# Patient Record
Sex: MALE | Race: WHITE | NOT HISPANIC OR LATINO | Employment: FULL TIME | URBAN - METROPOLITAN AREA
[De-identification: names, ages, dates, MRNs, and addresses within clinical notes are randomized per-mention and may not be internally consistent; named-entity substitution may affect disease eponyms.]

---

## 2021-04-23 ENCOUNTER — TELEPHONE (OUTPATIENT)
Dept: SCHEDULING | Facility: IMAGING CENTER | Age: 31
End: 2021-04-23

## 2021-05-06 ENCOUNTER — TELEPHONE (OUTPATIENT)
Dept: MEDICAL GROUP | Facility: LAB | Age: 31
End: 2021-05-06

## 2021-05-06 NOTE — TELEPHONE ENCOUNTER
NEW PATIENT VISIT PRE-VISIT PLANNING    1.  [employer] Patient is checked in Patient Demographics?Yes    2.  Immunizations were updated in Epic using Reconcile Outside Information activity? Yes         3.  Is this appointment scheduled as a Hospital Follow-Up? No    4.  Patient is due for the following Health Maintenance Topics:   Health Maintenance Due   Topic Date Due   • IMM PNEUMOCOCCAL VACCINE: 0-64 Years (1 of 1 - PPSV23) Never done   • IMM DTaP/Tdap/Td Vaccine (7 - Td) 01/18/2018     5.  Reviewed/Updated the following with patient:       •   Preferred Pharmacy? Yes       •   Preferred Lab? Yes       •   Preferred Communication? Yes       •   Allergies? Yes       •   Medications? YES. Was Abstract Encounter opened and chart updated? YES     6.  Updated Care Team?       •   DME Company (gait device, O2, CPAP, etc.) N\A       •   Other Specialists (eye doctor, derm, GYN, cardiology, endo, etc): N\A    7.  AHA (Puls8) form printed for Provider? N/A

## 2021-05-13 ENCOUNTER — OFFICE VISIT (OUTPATIENT)
Dept: MEDICAL GROUP | Facility: LAB | Age: 31
End: 2021-05-13
Payer: COMMERCIAL

## 2021-05-13 VITALS
WEIGHT: 315 LBS | SYSTOLIC BLOOD PRESSURE: 140 MMHG | RESPIRATION RATE: 18 BRPM | OXYGEN SATURATION: 100 % | HEART RATE: 73 BPM | HEIGHT: 76 IN | TEMPERATURE: 98.1 F | BODY MASS INDEX: 38.36 KG/M2 | DIASTOLIC BLOOD PRESSURE: 100 MMHG

## 2021-05-13 DIAGNOSIS — Z91.89 AT RISK FOR SLEEP APNEA: ICD-10-CM

## 2021-05-13 DIAGNOSIS — R03.0 ELEVATED BLOOD PRESSURE READING IN OFFICE WITHOUT DIAGNOSIS OF HYPERTENSION: ICD-10-CM

## 2021-05-13 DIAGNOSIS — G44.229 CHRONIC TENSION-TYPE HEADACHE, NOT INTRACTABLE: ICD-10-CM

## 2021-05-13 DIAGNOSIS — F32.9 MAJOR DEPRESSIVE DISORDER WITH CURRENT ACTIVE EPISODE, UNSPECIFIED DEPRESSION EPISODE SEVERITY, UNSPECIFIED WHETHER RECURRENT: Primary | ICD-10-CM

## 2021-05-13 DIAGNOSIS — D22.9 BENIGN MOLE: ICD-10-CM

## 2021-05-13 PROCEDURE — 99204 OFFICE O/P NEW MOD 45 MIN: CPT | Performed by: PHYSICIAN ASSISTANT

## 2021-05-13 ASSESSMENT — PATIENT HEALTH QUESTIONNAIRE - PHQ9
SUM OF ALL RESPONSES TO PHQ QUESTIONS 1-9: 6
5. POOR APPETITE OR OVEREATING: 0 - NOT AT ALL
CLINICAL INTERPRETATION OF PHQ2 SCORE: 1

## 2021-05-13 NOTE — ASSESSMENT & PLAN NOTE
New to me; chronic  Tend to start in the back of the head, though can start anywhere in the head.   Does report that he has occasional headaches upon waking.     Having increased stress and this worsens them    Reports that he does snore heavily and has apneic episodes. -Both mother and father have DEBORAH.

## 2021-05-17 NOTE — ASSESSMENT & PLAN NOTE
New to me; chronic and worsening, wife encouraging him to get treatment for this.   Not currently using medication for this.   Denies SI/HI  No previous hospitalizations for mental illness.

## 2021-05-17 NOTE — PROGRESS NOTES
oYu Sunmer is a 30 y.o. male new patient here for chronic headaches, MDD    HPI:    Chronic tension-type headache, not intractable  New to me; chronic  Tend to start in the back of the head, though can start anywhere in the head.   Does report that he has occasional headaches upon waking.     Having increased stress and this worsens them    Reports that he does snore heavily and has apneic episodes. -Both mother and father have DEBORAH.     Major depressive disorder  New to me; chronic and worsening, wife encouraging him to get treatment for this.   Not currently using medication for this.   Denies SI/HI  No previous hospitalizations for mental illness.     Current medicines (including changes today)  Current Outpatient Medications   Medication Sig Dispense Refill   • Aspirin-Acetaminophen-Caffeine (EXCEDRIN PO) Take  by mouth.       No current facility-administered medications for this visit.     He  has a past medical history of Head ache.  He  has a past surgical history that includes appendectomy.  Social History     Tobacco Use   • Smoking status: Former Smoker     Types: Cigarettes, Pipe   • Smokeless tobacco: Never Used   Vaping Use   • Vaping Use: Never used   Substance Use Topics   • Alcohol use: Yes     Comment: weekly   • Drug use: No     Social History     Social History Narrative        Works for [employer]      Family History   Problem Relation Age of Onset   • No Known Problems Mother    • Hyperlipidemia Father    • Hypertension Father    • Heart Disease Father    • No Known Problems Sister    • No Known Problems Brother    • No Known Problems Sister      Family Status   Relation Name Status   • Mo  Alive   • Fa  Alive   • Sis  Alive   • Bro  Alive   • Sis  Alive         ROS  +fatigue and weight gain   No chest pain, cough or shortness of breath.   No heartburn, abdominal pain or stool changes.   No joint pain or falls.   +headaches  No vision changes  +depression    All other systems  "reviewed and are negative     Objective:     /100   Pulse 73   Temp 36.7 °C (98.1 °F) (Temporal)   Resp 18   Ht 1.93 m (6' 4\")   Wt (!) 148 kg (325 lb 12.8 oz)   SpO2 100%  Body mass index is 39.66 kg/m².  Physical Exam:    Constitutional: Alert, no distress.  Skin: Warm, dry, good turgor, no rashes in visible areas.  Eye: Equal, round, conjunctiva clear, lids normal.  Neck: Trachea midline, no masses, no thyromegaly. No cervical or supraclavicular lymphadenopathy.  Respiratory: Unlabored respiratory effort, lungs clear to auscultation, no wheezes, no ronchi.  Cardiovascular: Normal S1, S2, no murmur, no edema.  Psych: Alert and oriented x3, normal affect and mood.      Assessment and Plan:   The following treatment plan was discussed    1. Major depressive disorder with current active episode, unspecified depression episode severity, unspecified whether recurrent  New to me; chronic and not currently treated.   Recommend referral to  for therapy/psychology at this time.   - REFERRAL TO BEHAVIORAL HEALTH  - CBC WITH DIFFERENTIAL; Future  - VITAMIN D,25 HYDROXY; Future    2. Chronic tension-type headache, not intractable  New to me; chronic and uncontrolled, though improve with OTC medication  I do believe that some of this is related to uncontrolled blood pressure as well as possible sleep apnea.   Will continue to monitor.     3. BMI 39.0-39.9,adult  BMI was assessed today and reviewed with patient as meeting criteria for the risk factor obesity.  Willing to change as well as barriers were assessed. A collaborative plan was made with the patient and goals were set. Assistance was discussed, including self-help and more formal medical adjunctive treatments.     Wt Readings from Last 3 Encounters:   05/13/21 (!) 148 kg (325 lb 12.8 oz)   09/23/15 104 kg (230 lb)   07/29/15 109 kg (240 lb)   - CBC WITH DIFFERENTIAL; Future  - Comp Metabolic Panel; Future  - Lipid Profile; Future  - HEMOGLOBIN A1C; " Future  - TSH WITH REFLEX TO FT4; Future  - REFERRAL TO PULMONARY AND SLEEP MEDICINE    4. At risk for sleep apnea  - REFERRAL TO PULMONARY AND SLEEP MEDICINE    5. Benign mole  - REFERRAL TO DERMATOLOGY    6. Elevated blood pressure reading in office without diagnosis of HTN  Continue to monitor BP at home.   DASH diet   RTC in 2 weeks for BP check.     My total time spent caring for the patient on the day of the encounter was 45 minutes.   This does not include time spent on separately billable procedures/tests.    Records requested.  Followup: Return in about 2 weeks (around 5/27/2021).       Mitzy Wiggins P.A.-C.  Supervising MD: Dr. Sven Melvin MD  05/17/21

## 2021-05-22 ENCOUNTER — HOSPITAL ENCOUNTER (OUTPATIENT)
Dept: LAB | Facility: MEDICAL CENTER | Age: 31
End: 2021-05-22
Attending: PHYSICIAN ASSISTANT
Payer: COMMERCIAL

## 2021-05-22 DIAGNOSIS — F32.9 MAJOR DEPRESSIVE DISORDER WITH CURRENT ACTIVE EPISODE, UNSPECIFIED DEPRESSION EPISODE SEVERITY, UNSPECIFIED WHETHER RECURRENT: ICD-10-CM

## 2021-05-22 PROCEDURE — 80053 COMPREHEN METABOLIC PANEL: CPT

## 2021-05-22 PROCEDURE — 82306 VITAMIN D 25 HYDROXY: CPT

## 2021-05-22 PROCEDURE — 84443 ASSAY THYROID STIM HORMONE: CPT

## 2021-05-22 PROCEDURE — 83036 HEMOGLOBIN GLYCOSYLATED A1C: CPT

## 2021-05-22 PROCEDURE — 80061 LIPID PANEL: CPT

## 2021-05-22 PROCEDURE — 36415 COLL VENOUS BLD VENIPUNCTURE: CPT

## 2021-05-22 PROCEDURE — 85025 COMPLETE CBC W/AUTO DIFF WBC: CPT

## 2021-05-24 LAB
ALBUMIN SERPL BCP-MCNC: 4.5 G/DL (ref 3.2–4.9)
ALBUMIN/GLOB SERPL: 1.5 G/DL
ALP SERPL-CCNC: 85 U/L (ref 30–99)
ALT SERPL-CCNC: 38 U/L (ref 2–50)
ANION GAP SERPL CALC-SCNC: 10 MMOL/L (ref 7–16)
AST SERPL-CCNC: 32 U/L (ref 12–45)
BASOPHILS # BLD AUTO: 1.5 % (ref 0–1.8)
BASOPHILS # BLD: 0.09 K/UL (ref 0–0.12)
BILIRUB SERPL-MCNC: 0.4 MG/DL (ref 0.1–1.5)
BUN SERPL-MCNC: 14 MG/DL (ref 8–22)
CALCIUM SERPL-MCNC: 9.4 MG/DL (ref 8.5–10.5)
CHLORIDE SERPL-SCNC: 105 MMOL/L (ref 96–112)
CHOLEST SERPL-MCNC: 156 MG/DL (ref 100–199)
CO2 SERPL-SCNC: 26 MMOL/L (ref 20–33)
CREAT SERPL-MCNC: 0.9 MG/DL (ref 0.5–1.4)
EOSINOPHIL # BLD AUTO: 0.24 K/UL (ref 0–0.51)
EOSINOPHIL NFR BLD: 4 % (ref 0–6.9)
ERYTHROCYTE [DISTWIDTH] IN BLOOD BY AUTOMATED COUNT: 41 FL (ref 35.9–50)
EST. AVERAGE GLUCOSE BLD GHB EST-MCNC: 108 MG/DL
GLOBULIN SER CALC-MCNC: 3.1 G/DL (ref 1.9–3.5)
GLUCOSE SERPL-MCNC: 83 MG/DL (ref 65–99)
HBA1C MFR BLD: 5.4 % (ref 4–5.6)
HCT VFR BLD AUTO: 45.5 % (ref 42–52)
HDLC SERPL-MCNC: 33 MG/DL
HGB BLD-MCNC: 14.4 G/DL (ref 14–18)
IMM GRANULOCYTES # BLD AUTO: 0.02 K/UL (ref 0–0.11)
IMM GRANULOCYTES NFR BLD AUTO: 0.3 % (ref 0–0.9)
LDLC SERPL CALC-MCNC: 91 MG/DL
LYMPHOCYTES # BLD AUTO: 1.8 K/UL (ref 1–4.8)
LYMPHOCYTES NFR BLD: 29.7 % (ref 22–41)
MCH RBC QN AUTO: 26.7 PG (ref 27–33)
MCHC RBC AUTO-ENTMCNC: 31.6 G/DL (ref 33.7–35.3)
MCV RBC AUTO: 84.4 FL (ref 81.4–97.8)
MONOCYTES # BLD AUTO: 0.58 K/UL (ref 0–0.85)
MONOCYTES NFR BLD AUTO: 9.6 % (ref 0–13.4)
NEUTROPHILS # BLD AUTO: 3.34 K/UL (ref 1.82–7.42)
NEUTROPHILS NFR BLD: 54.9 % (ref 44–72)
NRBC # BLD AUTO: 0 K/UL
NRBC BLD-RTO: 0 /100 WBC
PLATELET # BLD AUTO: 277 K/UL (ref 164–446)
PMV BLD AUTO: 9.4 FL (ref 9–12.9)
POTASSIUM SERPL-SCNC: 4.2 MMOL/L (ref 3.6–5.5)
PROT SERPL-MCNC: 7.6 G/DL (ref 6–8.2)
RBC # BLD AUTO: 5.39 M/UL (ref 4.7–6.1)
SODIUM SERPL-SCNC: 141 MMOL/L (ref 135–145)
TRIGL SERPL-MCNC: 161 MG/DL (ref 0–149)
TSH SERPL DL<=0.005 MIU/L-ACNC: 1.53 UIU/ML (ref 0.38–5.33)
WBC # BLD AUTO: 6.1 K/UL (ref 4.8–10.8)

## 2021-05-26 ENCOUNTER — NON-PROVIDER VISIT (OUTPATIENT)
Dept: MEDICAL GROUP | Facility: LAB | Age: 31
End: 2021-05-26
Payer: COMMERCIAL

## 2021-05-26 VITALS — DIASTOLIC BLOOD PRESSURE: 90 MMHG | SYSTOLIC BLOOD PRESSURE: 148 MMHG

## 2021-05-26 LAB — 25(OH)D3 SERPL-MCNC: 32 NG/ML (ref 30–80)

## 2021-05-26 NOTE — PROGRESS NOTES
You Sumner is a 30 y.o. male here for a non-provider visit for BP Check    Vitals:    05/26/21 1642   BP: 148/90   BP Location: Right arm   Patient Position: Sitting   BP Cuff Size: Large adult     If abnormal, was an in office provider notified today? Yes  Routed to PCP? Yes Appt tomorrow at 3:30

## 2021-05-27 ENCOUNTER — TELEMEDICINE (OUTPATIENT)
Dept: MEDICAL GROUP | Facility: LAB | Age: 31
End: 2021-05-27
Payer: COMMERCIAL

## 2021-05-27 VITALS — BODY MASS INDEX: 38.36 KG/M2 | WEIGHT: 315 LBS | HEIGHT: 76 IN

## 2021-05-27 DIAGNOSIS — I10 BENIGN ESSENTIAL HTN: ICD-10-CM

## 2021-05-27 PROCEDURE — 99213 OFFICE O/P EST LOW 20 MIN: CPT | Mod: 95,CR | Performed by: PHYSICIAN ASSISTANT

## 2021-05-27 RX ORDER — LOSARTAN POTASSIUM 50 MG/1
50 TABLET ORAL DAILY
Qty: 90 TABLET | Refills: 1 | Status: SHIPPED
Start: 2021-05-27 | End: 2021-07-02

## 2021-05-27 ASSESSMENT — FIBROSIS 4 INDEX: FIB4 SCORE: 0.54

## 2021-05-27 NOTE — PROGRESS NOTES
"This evaluation was conducted via Zoom using secure and encrypted videoconferencing technology. The patient was in a private location in the state [facility].    The patient's identity was confirmed and verbal consent was obtained for this virtual visit.    Subjective:     CC: BP follow up  You Sumner is a 30 y.o. male presenting to discuss the evaluation and management of BP follow up    Benign essential HTN  Follow up; pt has been checking BP at home and has been elevated  Came to clinic this morning and was 148/90.  Agreeable to starting medication today       ROS  See HPI  Constitutional: Negative for fever, chills and malaise/fatigue.   Respiratory: Negative for cough and shortness of breath.    Cardiovascular: Negative for leg swelling.   Skin: Negative for rash.   Psychiatric/Behavioral: Negative for depression.  The patient is not nervous/anxious.      No Known Allergies    Current medicines (including changes today)  Current Outpatient Medications   Medication Sig Dispense Refill   • losartan (COZAAR) 50 MG Tab Take 1 tablet by mouth every day. 90 tablet 1   • Aspirin-Acetaminophen-Caffeine (EXCEDRIN PO) Take  by mouth.       No current facility-administered medications for this visit.       He  has a past medical history of Head ache.  He  has a past surgical history that includes appendectomy.      Family History   Problem Relation Age of Onset   • No Known Problems Mother    • Hyperlipidemia Father    • Hypertension Father    • Heart Disease Father    • No Known Problems Sister    • No Known Problems Brother    • No Known Problems Sister      Family Status   Relation Name Status   • Mo  Alive   • Fa  Alive   • Sis  Alive   • Bro  Alive   • Sis  Alive       Patient Active Problem List    Diagnosis Date Noted   • Benign essential HTN 05/27/2021   • Major depressive disorder 05/13/2021   • Chronic tension-type headache, not intractable 05/13/2021          Objective:   Ht 1.93 m (6' 4\")   Wt " (!) 147 kg (325 lb)   BMI 39.56 kg/m²     Physical Exam:  Constitutional: Alert, no distress, well-groomed.  Skin: No rashes in visible areas.  Eye: Round. Conjunctiva clear, lids normal. No icterus.   ENMT: Lips pink without lesions, good dentition, moist mucous membranes. Phonation normal.  Neck: No masses, no thyromegaly. Moves freely without pain.  Respiratory: Unlabored respiratory effort, no cough or audible wheeze  Psych: Alert and oriented x3, normal affect and mood.       Assessment and Plan:   The following treatment plan was discussed:     1. Benign essential HTN  Follow up; continue to monitor bp at home.   Rx as written.   Pt was educated on use and SEs of medication.  DASH/low sodium diet.   Increase exercise as tolerated    We recommend decreasing saturated fat which are found in meats that come from a cow or pig, and found in creams, cheeses, butter, sandoval, and fried foods. We recommend more vegetables, fruits, fish, nuts, olive oil and exercising 5 times a week for 30 minute as tolerated     - losartan (COZAAR) 50 MG Tab; Take 1 tablet by mouth every day.  Dispense: 90 tablet; Refill: 1        Follow-up: Return in about 3 weeks (around 6/17/2021).    Mitzy Wiggins P.A.-C.  Supervising MD: Dr. Sven Melvin MD  05/27/21

## 2021-05-27 NOTE — ASSESSMENT & PLAN NOTE
Follow up; pt has been checking BP at home and has been elevated  Came to clinic this morning and was 148/90.  Agreeable to starting medication today

## 2021-07-02 ENCOUNTER — OFFICE VISIT (OUTPATIENT)
Dept: MEDICAL GROUP | Facility: LAB | Age: 31
End: 2021-07-02
Payer: COMMERCIAL

## 2021-07-02 VITALS
SYSTOLIC BLOOD PRESSURE: 148 MMHG | HEIGHT: 76 IN | HEART RATE: 91 BPM | OXYGEN SATURATION: 96 % | WEIGHT: 315 LBS | DIASTOLIC BLOOD PRESSURE: 108 MMHG | TEMPERATURE: 98.2 F | RESPIRATION RATE: 18 BRPM | BODY MASS INDEX: 38.36 KG/M2

## 2021-07-02 DIAGNOSIS — I10 BENIGN ESSENTIAL HTN: ICD-10-CM

## 2021-07-02 PROCEDURE — 99213 OFFICE O/P EST LOW 20 MIN: CPT | Performed by: PHYSICIAN ASSISTANT

## 2021-07-02 RX ORDER — LOSARTAN POTASSIUM AND HYDROCHLOROTHIAZIDE 25; 100 MG/1; MG/1
1 TABLET ORAL DAILY
Qty: 30 TABLET | Refills: 0 | Status: SHIPPED | OUTPATIENT
Start: 2021-07-02 | End: 2021-08-04 | Stop reason: SDUPTHER

## 2021-07-02 RX ORDER — LOSARTAN POTASSIUM AND HYDROCHLOROTHIAZIDE 25; 100 MG/1; MG/1
1 TABLET ORAL DAILY
Qty: 30 TABLET | Refills: 1 | Status: CANCELLED | OUTPATIENT
Start: 2021-07-02

## 2021-07-02 ASSESSMENT — FIBROSIS 4 INDEX: FIB4 SCORE: 0.54

## 2021-07-02 NOTE — PATIENT INSTRUCTIONS
"DASH Eating Plan  DASH stands for \"Dietary Approaches to Stop Hypertension.\" The DASH eating plan is a healthy eating plan that has been shown to reduce high blood pressure (hypertension). It may also reduce your risk for type 2 diabetes, heart disease, and stroke. The DASH eating plan may also help with weight loss.  What are tips for following this plan?    General guidelines  · Avoid eating more than 2,300 mg (milligrams) of salt (sodium) a day. If you have hypertension, you may need to reduce your sodium intake to 1,500 mg a day.  · Limit alcohol intake to no more than 1 drink a day for nonpregnant women and 2 drinks a day for men. One drink equals 12 oz of beer, 5 oz of wine, or 1½ oz of hard liquor.  · Work with your health care provider to maintain a healthy body weight or to lose weight. Ask what an ideal weight is for you.  · Get at least 30 minutes of exercise that causes your heart to beat faster (aerobic exercise) most days of the week. Activities may include walking, swimming, or biking.  · Work with your health care provider or diet and nutrition specialist (dietitian) to adjust your eating plan to your individual calorie needs.  Reading food labels    · Check food labels for the amount of sodium per serving. Choose foods with less than 5 percent of the Daily Value of sodium. Generally, foods with less than 300 mg of sodium per serving fit into this eating plan.  · To find whole grains, look for the word \"whole\" as the first word in the ingredient list.  Shopping  · Buy products labeled as \"low-sodium\" or \"no salt added.\"  · Buy fresh foods. Avoid canned foods and premade or frozen meals.  Cooking  · Avoid adding salt when cooking. Use salt-free seasonings or herbs instead of table salt or sea salt. Check with your health care provider or pharmacist before using salt substitutes.  · Do not grace foods. Cook foods using healthy methods such as baking, boiling, grilling, and broiling instead.  · Cook with " heart-healthy oils, such as olive, canola, soybean, or sunflower oil.  Meal planning  · Eat a balanced diet that includes:  ? 5 or more servings of fruits and vegetables each day. At each meal, try to fill half of your plate with fruits and vegetables.  ? Up to 6-8 servings of whole grains each day.  ? Less than 6 oz of lean meat, poultry, or fish each day. A 3-oz serving of meat is about the same size as a deck of cards. One egg equals 1 oz.  ? 2 servings of low-fat dairy each day.  ? A serving of nuts, seeds, or beans 5 times each week.  ? Heart-healthy fats. Healthy fats called Omega-3 fatty acids are found in foods such as flaxseeds and coldwater fish, like sardines, salmon, and mackerel.  · Limit how much you eat of the following:  ? Canned or prepackaged foods.  ? Food that is high in trans fat, such as fried foods.  ? Food that is high in saturated fat, such as fatty meat.  ? Sweets, desserts, sugary drinks, and other foods with added sugar.  ? Full-fat dairy products.  · Do not salt foods before eating.  · Try to eat at least 2 vegetarian meals each week.  · Eat more home-cooked food and less restaurant, buffet, and fast food.  · When eating at a restaurant, ask that your food be prepared with less salt or no salt, if possible.  What foods are recommended?  The items listed may not be a complete list. Talk with your dietitian about what dietary choices are best for you.  Grains  Whole-grain or whole-wheat bread. Whole-grain or whole-wheat pasta. Brown rice. Oatmeal. Quinoa. Bulgur. Whole-grain and low-sodium cereals. Lisa bread. Low-fat, low-sodium crackers. Whole-wheat flour tortillas.  Vegetables  Fresh or frozen vegetables (raw, steamed, roasted, or grilled). Low-sodium or reduced-sodium tomato and vegetable juice. Low-sodium or reduced-sodium tomato sauce and tomato paste. Low-sodium or reduced-sodium canned vegetables.  Fruits  All fresh, dried, or frozen fruit. Canned fruit in natural juice (without  added sugar).  Meat and other protein foods  Skinless chicken or turkey. Ground chicken or turkey. Pork with fat trimmed off. Fish and seafood. Egg whites. Dried beans, peas, or lentils. Unsalted nuts, nut butters, and seeds. Unsalted canned beans. Lean cuts of beef with fat trimmed off. Low-sodium, lean deli meat.  Dairy  Low-fat (1%) or fat-free (skim) milk. Fat-free, low-fat, or reduced-fat cheeses. Nonfat, low-sodium ricotta or cottage cheese. Low-fat or nonfat yogurt. Low-fat, low-sodium cheese.  Fats and oils  Soft margarine without trans fats. Vegetable oil. Low-fat, reduced-fat, or light mayonnaise and salad dressings (reduced-sodium). Canola, safflower, olive, soybean, and sunflower oils. Avocado.  Seasoning and other foods  Herbs. Spices. Seasoning mixes without salt. Unsalted popcorn and pretzels. Fat-free sweets.  What foods are not recommended?  The items listed may not be a complete list. Talk with your dietitian about what dietary choices are best for you.  Grains  Baked goods made with fat, such as croissants, muffins, or some breads. Dry pasta or rice meal packs.  Vegetables  Creamed or fried vegetables. Vegetables in a cheese sauce. Regular canned vegetables (not low-sodium or reduced-sodium). Regular canned tomato sauce and paste (not low-sodium or reduced-sodium). Regular tomato and vegetable juice (not low-sodium or reduced-sodium). Pickles. Olives.  Fruits  Canned fruit in a light or heavy syrup. Fried fruit. Fruit in cream or butter sauce.  Meat and other protein foods  Fatty cuts of meat. Ribs. Fried meat. Saldivar. Sausage. Bologna and other processed lunch meats. Salami. Fatback. Hotdogs. Bratwurst. Salted nuts and seeds. Canned beans with added salt. Canned or smoked fish. Whole eggs or egg yolks. Chicken or turkey with skin.  Dairy  Whole or 2% milk, cream, and half-and-half. Whole or full-fat cream cheese. Whole-fat or sweetened yogurt. Full-fat cheese. Nondairy creamers. Whipped toppings.  Processed cheese and cheese spreads.  Fats and oils  Butter. Stick margarine. Lard. Shortening. Ghee. Saldivar fat. Tropical oils, such as coconut, palm kernel, or palm oil.  Seasoning and other foods  Salted popcorn and pretzels. Onion salt, garlic salt, seasoned salt, table salt, and sea salt. Worcestershire sauce. Tartar sauce. Barbecue sauce. Teriyaki sauce. Soy sauce, including reduced-sodium. Steak sauce. Canned and packaged gravies. Fish sauce. Oyster sauce. Cocktail sauce. Horseradish that you find on the shelf. Ketchup. Mustard. Meat flavorings and tenderizers. Bouillon cubes. Hot sauce and Tabasco sauce. Premade or packaged marinades. Premade or packaged taco seasonings. Relishes. Regular salad dressings.  Where to find more information:  · National Heart, Lung, and Blood [place]: www.nhlbi.nih.gov  · American Heart Association: www.heart.org  Summary  · The DASH eating plan is a healthy eating plan that has been shown to reduce high blood pressure (hypertension). It may also reduce your risk for type 2 diabetes, heart disease, and stroke.  · With the DASH eating plan, you should limit salt (sodium) intake to 2,300 mg a day. If you have hypertension, you may need to reduce your sodium intake to 1,500 mg a day.  · When on the DASH eating plan, aim to eat more fresh fruits and vegetables, whole grains, lean proteins, low-fat dairy, and heart-healthy fats.  · Work with your health care provider or diet and nutrition specialist (dietitian) to adjust your eating plan to your individual calorie needs.  This information is not intended to replace advice given to you by your health care provider. Make sure you discuss any questions you have with your health care provider.  Document Released: 12/06/2012 Document Revised: 11/30/2018 Document Reviewed: 12/11/2017  Elsevier Patient Education © 2020 [employer] Inc[employer]

## 2021-07-02 NOTE — ASSESSMENT & PLAN NOTE
Follow up; Pt is currently using losartan 50 mg p.o. once daily.  Blood pressure was significantly elevated today upon initial intake.  However he has had a stressful day at work and we called him back before he could rest in the waiting room.  Second check of blood pressure today was 148/108.  Patient denies any chest pain, shortness of breath and  no palpitations.   Reports that he has been eating a lot of fast food lately.

## 2021-07-02 NOTE — PROGRESS NOTES
"Subjective:   You Sumner is a 30 y.o. male here today for BP follow up     Benign essential HTN  Follow up; Pt is currently using losartan 50 mg p.o. once daily.  Blood pressure was significantly elevated today upon initial intake.  However he has had a stressful day at work and we called him back before he could rest in the waiting room.  Second check of blood pressure today was 148/108.  Patient denies any chest pain, shortness of breath and  no palpitations.   Reports that he has been eating a lot of fast food lately.       Current medicines (including changes today)  Current Outpatient Medications   Medication Sig Dispense Refill   • losartan-hydrochlorothiazide (HYZAAR) 100-25 MG per tablet Take 1 tablet by mouth every day. 30 tablet 0   • Aspirin-Acetaminophen-Caffeine (EXCEDRIN PO) Take  by mouth.       No current facility-administered medications for this visit.     He  has a past medical history of Head ache.    ROS No chest pain, no shortness of breath, no abdominal pain       Objective:     /108 (BP Location: Left arm, Patient Position: Sitting, BP Cuff Size: Large adult)   Pulse 91   Temp 36.8 °C (98.2 °F) (Temporal)   Resp 18   Ht 1.93 m (6' 4\")   Wt (!) 149 kg (328 lb 3.2 oz)   SpO2 96%  Body mass index is 39.95 kg/m².   Physical Exam:  Constitutional: Alert, no distress.  Skin: Warm, dry, good turgor, no rashes in visible areas.  Eye: Equal, round, conjunctiva clear, lids normal.  Neck: Trachea midline, no masses, no thyromegaly.   Respiratory: Unlabored respiratory effort, lungs clear to auscultation, no wheezes, no ronchi.  Cardiovascular: Normal S1, S2, no murmur, no edema.  Psych: Alert and oriented x3, normal affect and mood.    Assessment and Plan:   The following treatment plan was discussed    1. Benign essential HTN  Follow-up, blood pressure is not well controlled today.  Discontinue losartan 50 mg today.  Start Hyzaar 100-25 mg p.o. once daily.  Patient was " educated on use and side effects of medication.  ED precautions if blood pressure is 200/100.  Or if he develops any headache, chest pain, shortness of breath or palpitations or vision changes.   Patient to follow-up in 1 week for BP check with the nurses.  Follow-up thereafter is TBD pending BP reading in 1 week.   Discussed reducing fast food.  Patient was given handout about DASH diet.  - losartan-hydrochlorothiazide (HYZAAR) 100-25 MG per tablet; Take 1 tablet by mouth every day.  Dispense: 30 tablet; Refill: 0      Followup: Return in about 1 week (around 7/9/2021) for BP check .       Mitzy Wiggins P.A.-C.  Supervising MD: Dr. Sven Melvin MD  07/02/21

## 2021-07-09 ENCOUNTER — NON-PROVIDER VISIT (OUTPATIENT)
Dept: MEDICAL GROUP | Facility: LAB | Age: 31
End: 2021-07-09
Payer: COMMERCIAL

## 2021-07-09 VITALS — SYSTOLIC BLOOD PRESSURE: 110 MMHG | DIASTOLIC BLOOD PRESSURE: 76 MMHG | HEART RATE: 78 BPM | OXYGEN SATURATION: 95 %

## 2021-07-09 PROCEDURE — 99999 PR NO CHARGE: CPT | Performed by: PHYSICIAN ASSISTANT

## 2021-07-09 NOTE — NON-PROVIDER
You Sumner is a 30 y.o. male here for a non-provider visit for Blood Pressure Check.    Vitals:    07/09/21 1601 07/09/21 1612   BP: 120/76 110/76   Pulse: 95 78   SpO2: 95%      - Home Monitoring: Blood Pressure at home ranging: Patient does not take BP at home  - Medications: Patient did take blood pressure medication(s) today before appointment. Last dose of blood pressure medication(s) was on 7/9/21 this AM.   - Medication Compliance: Patient reports they are taking blood pressure medication(s) every day as prescribed by provider.    Patient does not have symptoms today.    Outcome: Education Provided: continue with low salt diet and BP meds daily    Routed to PCP? Yes    -portion control  -1500mg sodium daily goal; avoiding high salt foods and looking at the food labels now

## 2021-07-28 ENCOUNTER — OFFICE VISIT (OUTPATIENT)
Dept: BEHAVIORAL HEALTH | Facility: CLINIC | Age: 31
End: 2021-07-28
Payer: COMMERCIAL

## 2021-07-28 DIAGNOSIS — F32.1 CURRENT MODERATE EPISODE OF MAJOR DEPRESSIVE DISORDER, UNSPECIFIED WHETHER RECURRENT (HCC): ICD-10-CM

## 2021-07-28 PROCEDURE — 90791 PSYCH DIAGNOSTIC EVALUATION: CPT | Performed by: SOCIAL WORKER

## 2021-07-28 NOTE — PROGRESS NOTES
"[facility]   Initial Assessment    Name: You Sumner  MRN: [id number]  : 1990  Age: 30 y.o.  Date of assessment: 2021  PCP: Mitzy Wiggins P.A.-C.  Persons in attendance: Patient  Total session time: 45 minutes      CHIEF COMPLAINT AND HISTORY OF PRESENTING PROBLEM:  (as stated by Patient):  You Sumner is a 30 y.o., White male referred for assessment by Mitzy Wiggins,*.  Primary presenting issue includesHe had spoken to his primary care physician about having someone to talk to and he was referred here. He reports that his wife of one year wanted him to get help with his communication. They have been together 3 years prior to their marriage. He was in a prior relationship before his wife and that relationship did not end well. He reports some trust issues that developed from this relationship and perhaps with his dad as well. He had moved to [place] with his previous girlfriend after when she moved here with him became homesick and wanting to return to be close to her family. She and her family had previously lived in [place] and they went to [place] together[place] and the engagement was ended.    After returning from there he met his wife and they dated a couple of years before marrying in . He reports that things have worsen over the past 6 months between and that they suffered a miscarriage in January.  He identifies as an introvert, and school was not always easy for him. He reported learning in the third grade and maybe sooner that to act out his emotion was getting him sent home from school.  \"  So I learned to beat down my emotions and not think about it\".    He admits when his wife begins to talk at him sometimes he just shuts down. \" I am not sure she understands me sometimes\".    BEHAVIORAL HEALTH TREATMENT HISTORY  Does patient/parent report a history of prior " behavioral health treatment for patient? not voluntarily, family counseling with Aunt in [place], couple of sessions  History of untreated behavioral health issues identified? issues from childhood  Does patient/parent report change in appetite or weight loss/gain? gaining weight,emotional/stress eating do not like empty stomach since like age 11.  Does patient/parent report physical pain? plantar facitius              Indicate if pain is acute or chronic, and location: foot              Pain scale rating:  Varies 3-8         FAMILY/SOCIAL HISTORY  Current living situation/household members: Reside with wife. Have a roommate. There was a miscarriage in January.  Does patient/parent report a family history of behavioral health issues, diagnoses, or treatment?   Family History   Problem Relation Age of Onset   • No Known Problems Mother    • Hyperlipidemia Father    • Hypertension Father    • Heart Disease Father    • No Known Problems Sister    • No Known Problems Brother    • No Known Problems Sister           EMPLOYMENT/RESOURCES  Is the patient currently employed? Yes  Does the patient/parent report adequate financial resources? paycheck to [employer]. Wife at [employer] and he is at [employer]       HISTORY:  Does patient report current or past enlistment? No               [If yes, complete below items]  Does patient report history of exposure to combat? No      SPIRITUAL/CULTURAL/IDENTITY:  What are the patient's/family's spiritual beliefs or practices? Was raised nondemontial, do not attend now- ritualistic Orthodox      ABUSE/NEGLECT/TRAUMA SCREENING  Does patient report feeling “unsafe” in his/her home, or afraid of anyone? No  Does patient report any history of physical, sexual, or emotional abuse? 'probably' physical many instances, emotionally sexually not sure, do not think so  Is there evidence of neglect by self? No  Is there evidence of neglect by a caregiver? No                                   "                                                                        SAFETY ASSESSMENT - SELF  Does patient acknowledge current or past symptoms of dangerousness to self? in the past, no attempts earliest time when [age]. Teenager in high school, never feel doing anything right\"  Recent change in frequency/specificity/intensity of suicidal thoughts or self-harm behavior? not as bad but still over so often. Job in october was a poor situation until left to work for [employer]  Current access to firearms, medications, or other identified means of suicide/self-harm? would not kill self and do not think my meds would work for that  If yes, willing to restrict access to means of suicide/self-harm? not indicated at this time      Current Suicide Risk: Not applicable  Crisis Safety Plan completed and copy given to patient: not indicated      SAFETY ASSESSMENT - OTHERS  Recent change in frequency/specificity/intensity of thoughts or threats to harm others? No  If Yes:  Current access to firearms/other identified means of harm?   If yes, willing to restrict access to weapons/means of harm?     Current Homicide Risk:  Not applicable  Crisis Safety Plan completed and copy given to patient? not indicated  Based on information provided during the current assessment, is a mandated “duty to warn” being exercised? No      SUBSTANCE USE/ADDICTION HISTORY  Patient denies use of any substance/addictive behaviors drink occassionally not as much asa before. use to smoke like a 'fiend'. beer maybe 2-3 beers every three days or so or weekends more often not driving    If No:  Is there a family history of substance use/addiction? not that I know of, \" my dad is old mother f\" so probably did stuff in college  Does patient acknowledge or parent/significant other report use of/dependence on substances? No  Last time patient used alcohol: 2- 3 beers every three days  Within the past week? Yes  Last time patient used marijuana: no  Within the past " "month? No  Any other street drugs ever tried even once? No  Any use of prescription medications/pills without a prescription, or for reasons others than originally prescribed?  No  Any other addictive behavior reported (gambling, shopping, sex)? try not to excessively shop, mostly books             MENTAL STATUS/OBSERVATIONS              Participation: Active verbal participation  Grooming: Neat  Orientation:Alert   Behavior: Calm  Eye contact: Good          Mood:Euthymic  Affect:Congruent with content  Thought process: Goal-directed  Thought content:  Within normal limits  Speech: Rate within normal limits and Volume within normal limits  Perception: Within normal limits  Memory: No gross evidence of memory deficits  Insight: Adequate  Judgment:  Adequate  Other:               Family/couple interaction observations: not applicable      Patient's motivation/readiness for change:  \" Wants to have better communication with wife and explore concerns surrounding trust\".    Topics addressed in psychotherapy include:  Initial session and building rapport    Care plan completed: initial session will complete next session  Does patient express agreement with the above plan? Yes     Diagnosis:  1. Current moderate episode of major depressive disorder, unspecified whether recurrent (HCC)        Referral appointment(s) scheduled? Yes       Donna Bruce L.C.S.W.    "

## 2021-08-04 DIAGNOSIS — I10 BENIGN ESSENTIAL HTN: ICD-10-CM

## 2021-08-04 RX ORDER — LOSARTAN POTASSIUM AND HYDROCHLOROTHIAZIDE 25; 100 MG/1; MG/1
1 TABLET ORAL DAILY
Qty: 30 TABLET | Refills: 0 | Status: SHIPPED | OUTPATIENT
Start: 2021-08-04 | End: 2021-09-13

## 2021-08-04 NOTE — TELEPHONE ENCOUNTER
----- Message from You Sumner sent at 8/4/2021  1:55 PM PDT -----  Regarding: Prescription Question  Contact: [phone]  Hello,   I'm nearing the end of my supply of 100mg Losartan/ diarrhetic. I noticed that the bottle didn't have any refills. Did you still want me to take that med?

## 2021-08-17 ENCOUNTER — OFFICE VISIT (OUTPATIENT)
Dept: BEHAVIORAL HEALTH | Facility: CLINIC | Age: 31
End: 2021-08-17
Payer: COMMERCIAL

## 2021-08-17 DIAGNOSIS — F32.1 CURRENT MODERATE EPISODE OF MAJOR DEPRESSIVE DISORDER, UNSPECIFIED WHETHER RECURRENT (HCC): ICD-10-CM

## 2021-08-17 PROCEDURE — 90834 PSYTX W PT 45 MINUTES: CPT | Performed by: SOCIAL WORKER

## 2021-08-17 NOTE — PROGRESS NOTES
[facility]   Therapy Progress Note        Name: You Sumner  MRN: [id number]  : 1990  Age: 30 y.o.  Date of assessment: 2021  PCP: Mitzy Wiggins P.A.-C.  Persons in attendance: Patient  Total session time: 45 minutes      Topics addressed in psychotherapy include:  Leroy is still very stressed about his relationship. They seem to be seperating and he admits he is often pulling away as he as come to 'equate her with stress'.  He reported that they had a good week as he was supportive about her work situation but then last night it turned upside down as the focus was her health and he had to help her go over to care for some pets that he did not want to do.    She called during session which he was frustrated with as she seemed irritated with him for being in session. Discussed in an attempt to reconnect as a couple that they eat at the table at least a couple of nights and discussion is on them only. He thought he would really like to try this, although agreed tonight probably not a good time to introduce.    Objective Observations:   Participation:Active verbal participation   Grooming:Neat   Cognition:Alert   Eye Contact:Good   Mood:Depressed and Irritable   Affect:Sad and frustrated   Thought Process:Logical   Speech:Rate within normal limits and Volume within normal limits    Current Risk:   Suicide: not indicated   Homicide: not indicated   Self-Harm: not indicated   Relapse: not indicated   Safety Plan Reviewed: not applicable    Care Plan Updated: No    Does patient express agreement with the above plan? Yes     Diagnosis:  No diagnosis found.    Referral appointment(s) scheduled? Yes       Donna Bruce L.C.S.W.

## 2021-08-18 ENCOUNTER — APPOINTMENT (RX ONLY)
Dept: URBAN - METROPOLITAN AREA CLINIC 4 | Facility: CLINIC | Age: 31
Setting detail: DERMATOLOGY
End: 2021-08-18

## 2021-08-18 DIAGNOSIS — D22 MELANOCYTIC NEVI: ICD-10-CM

## 2021-08-18 PROBLEM — D48.5 NEOPLASM OF UNCERTAIN BEHAVIOR OF SKIN: Status: ACTIVE | Noted: 2021-08-18

## 2021-08-18 PROBLEM — D22.4 MELANOCYTIC NEVI OF SCALP AND NECK: Status: ACTIVE | Noted: 2021-08-18

## 2021-08-18 PROCEDURE — ? BIOPSY BY SHAVE METHOD

## 2021-08-18 PROCEDURE — ? COUNSELING

## 2021-08-18 PROCEDURE — 99202 OFFICE O/P NEW SF 15 MIN: CPT | Mod: 25

## 2021-08-18 PROCEDURE — 11102 TANGNTL BX SKIN SINGLE LES: CPT

## 2021-08-18 ASSESSMENT — LOCATION DETAILED DESCRIPTION DERM
LOCATION DETAILED: LEFT INFERIOR OCCIPITAL SCALP
LOCATION DETAILED: RIGHT OCCIPITAL SCALP

## 2021-08-18 ASSESSMENT — LOCATION SIMPLE DESCRIPTION DERM: LOCATION SIMPLE: POSTERIOR SCALP

## 2021-08-18 ASSESSMENT — LOCATION ZONE DERM: LOCATION ZONE: SCALP

## 2021-08-18 NOTE — PROCEDURE: BIOPSY BY SHAVE METHOD
Detail Level: Detailed
Depth Of Biopsy: dermis
Was A Bandage Applied: Yes
Size Of Lesion In Cm: 0.9
X Size Of Lesion In Cm: 0
Biopsy Type: H and E
Biopsy Method: Dermablade
Anesthesia Type: 1% lidocaine with epinephrine
Anesthesia Volume In Cc: 0.5
Hemostasis: Drysol
Wound Care: Petrolatum
Dressing: bandage
Destruction After The Procedure: No
Type Of Destruction Used: Curettage
Curettage Text: The wound bed was treated with curettage after the biopsy was performed.
Cryotherapy Text: The wound bed was treated with cryotherapy after the biopsy was performed.
Electrodesiccation Text: The wound bed was treated with electrodesiccation after the biopsy was performed.
Electrodesiccation And Curettage Text: The wound bed was treated with electrodesiccation and curettage after the biopsy was performed.
Silver Nitrate Text: The wound bed was treated with silver nitrate after the biopsy was performed.
Lab: [id number]
Lab Facility: 
Consent: Written consent was obtained and risks were reviewed including but not limited to scarring, infection, bleeding, scabbing, incomplete removal, nerve damage and allergy to anesthesia.
Post-Care Instructions: I reviewed with the patient in detail post-care instructions. Patient is to keep the biopsy site dry overnight, and then apply bacitracin twice daily until healed. Patient may apply hydrogen peroxide soaks to remove any crusting.
Notification Instructions: Patient will be notified of biopsy results. However, patient instructed to call the office if not contacted within 2 weeks.
Billing Type: Third-Party Bill
Information: Selecting Yes will display possible errors in your note based on the variables you have selected. This validation is only offered as a suggestion for you. PLEASE NOTE THAT THE VALIDATION TEXT WILL BE REMOVED WHEN YOU FINALIZE YOUR NOTE. IF YOU WANT TO FAX A PRELIMINARY NOTE YOU WILL NEED TO TOGGLE THIS TO 'NO' IF YOU DO NOT WANT IT IN YOUR FAXED NOTE.

## 2021-09-02 ENCOUNTER — APPOINTMENT (OUTPATIENT)
Dept: BEHAVIORAL HEALTH | Facility: CLINIC | Age: 31
End: 2021-09-02
Payer: COMMERCIAL

## 2021-09-13 DIAGNOSIS — I10 BENIGN ESSENTIAL HTN: ICD-10-CM

## 2021-09-13 RX ORDER — LOSARTAN POTASSIUM AND HYDROCHLOROTHIAZIDE 25; 100 MG/1; MG/1
TABLET ORAL
Qty: 30 TABLET | Refills: 0 | Status: SHIPPED | OUTPATIENT
Start: 2021-09-13 | End: 2021-10-22 | Stop reason: SDUPTHER

## 2021-09-13 NOTE — TELEPHONE ENCOUNTER
Received request via: Pharmacy    Was the patient seen in the last year in this department? Yes  7/2/21  Does the patient have an active prescription (recently filled or refills available) for medication(s) requested? No

## 2021-10-07 ENCOUNTER — OFFICE VISIT (OUTPATIENT)
Dept: BEHAVIORAL HEALTH | Facility: CLINIC | Age: 31
End: 2021-10-07
Payer: COMMERCIAL

## 2021-10-07 DIAGNOSIS — F32.1 CURRENT MODERATE EPISODE OF MAJOR DEPRESSIVE DISORDER, UNSPECIFIED WHETHER RECURRENT (HCC): ICD-10-CM

## 2021-10-07 PROCEDURE — 90837 PSYTX W PT 60 MINUTES: CPT | Performed by: SOCIAL WORKER

## 2021-10-07 ASSESSMENT — PATIENT HEALTH QUESTIONNAIRE - PHQ9
CLINICAL INTERPRETATION OF PHQ2 SCORE: 6
SUM OF ALL RESPONSES TO PHQ QUESTIONS 1-9: 14
5. POOR APPETITE OR OVEREATING: 2 - MORE THAN HALF THE DAYS

## 2021-10-07 NOTE — PROGRESS NOTES
"[facility]   Therapy Progress Note        Name: You Sumner  MRN: [id number]  : 1990  Age: 30 y.o.  Date of assessment: 10/7/2021  PCP: Mitzy Wiggins P.A.-C.  Persons in attendance: Patient  Total session time: 55 minutes      Topics addressed in psychotherapy include: \" everything is really bad, so long since we saw one another. So long between appointments. Very frustrated. He did make an appointment for couple's therapy which should assist them. They seem to have no concept of how to interact with one another and anger and frustration build and then they stop talking or begin yelling at one another.    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 Gave the PHQ9 and he is still on the high end of moderate but we made an appointment for psychiatry as he describes his mood falling deeper and deeper.  Encouraged that he give himself some time when he comes home to take a shower or just change out of his uniform before engaging.    Objective Observations:   Participation:Active verbal participation   Grooming:Neat   Cognition:Alert   Eye Contact:Good   Mood:Depressed   Affect:Sad and Tearful   Thought Process:Logical and Goal-directed   Speech:Rate within normal limits and Volume within normal limits    Current Risk:   Suicide: not indicated   Homicide: not indicated   Self-Harm: not indicated   Relapse: not indicated   Safety Plan Reviewed: not applicable    Care Plan Updated: Yes    Does patient express agreement with the above plan? Yes     Diagnosis:  1. Current moderate episode of major depressive disorder, unspecified whether recurrent (HCC)        Referral appointment(s) scheduled? Yes       Donna Bruce L.C.S.W.      "

## 2021-10-19 ENCOUNTER — OFFICE VISIT (OUTPATIENT)
Dept: BEHAVIORAL HEALTH | Facility: CLINIC | Age: 31
End: 2021-10-19
Payer: COMMERCIAL

## 2021-10-19 DIAGNOSIS — Z71.89 SEEN BY MARRIAGE GUIDANCE COUNSELOR: ICD-10-CM

## 2021-10-19 DIAGNOSIS — F33.41 RECURRENT MAJOR DEPRESSIVE DISORDER, IN PARTIAL REMISSION (HCC): ICD-10-CM

## 2021-10-19 PROCEDURE — 90847 FAMILY PSYTX W/PT 50 MIN: CPT | Performed by: MARRIAGE & FAMILY THERAPIST

## 2021-10-22 DIAGNOSIS — I10 BENIGN ESSENTIAL HTN: ICD-10-CM

## 2021-10-22 RX ORDER — LOSARTAN POTASSIUM AND HYDROCHLOROTHIAZIDE 25; 100 MG/1; MG/1
TABLET ORAL
Qty: 30 TABLET | Refills: 2 | Status: SHIPPED | OUTPATIENT
Start: 2021-10-22 | End: 2022-01-28

## 2021-10-22 NOTE — TELEPHONE ENCOUNTER
Received request via: Patient    Was the patient seen in the last year in this department? Yes  7/2/2021  Does the patient have an active prescription (recently filled or refills available) for medication(s) requested? No    ----- Message from You Sumner sent at 10/22/2021  4:56 AM PDT -----  Regarding: Prescription refills  It was my Losartan/-25.

## 2021-11-10 ENCOUNTER — OFFICE VISIT (OUTPATIENT)
Dept: BEHAVIORAL HEALTH | Facility: CLINIC | Age: 31
End: 2021-11-10
Payer: COMMERCIAL

## 2021-11-10 DIAGNOSIS — F32.1 CURRENT MODERATE EPISODE OF MAJOR DEPRESSIVE DISORDER, UNSPECIFIED WHETHER RECURRENT (HCC): ICD-10-CM

## 2021-11-10 PROBLEM — F33.9 EPISODE OF RECURRENT MAJOR DEPRESSIVE DISORDER (HCC): Status: ACTIVE | Noted: 2021-05-13

## 2021-11-10 PROCEDURE — 99205 OFFICE O/P NEW HI 60 MIN: CPT | Performed by: PSYCHIATRY & NEUROLOGY

## 2021-11-10 RX ORDER — ESCITALOPRAM OXALATE 10 MG/1
10 TABLET ORAL
Qty: 30 TABLET | Refills: 1 | Status: SHIPPED | OUTPATIENT
Start: 2021-11-10 | End: 2021-12-23 | Stop reason: SDUPTHER

## 2021-11-10 NOTE — PROGRESS NOTES
"IN-PERSON SESSION IN CLINIC      INITIAL PSYCHIATRIC EVALUATION      This provider informed the patient their medical records are totally confidential except for the use by other providers involved in their care, or if the patient signs a release, or to report instances of child or elder abuse, or if it is determined they are an immediate risk to harm themselves or others.      CHIEF COMPLAINT  \" Need help with depression\"      HISTORY OF PRESENT ILLNESS  You Sumner is a 30 y.o. old male comes in today to establish care and for evaluation of depression and anxiety.  I did reviewed all outpatient psychiatry follow up notes over last 3 years. Patient is new to the clinic.  Patient has noticed recent worsening of depression with low energy, low motivation, feelings of guilt, poor concentration and in past was having passive death wishes but denies any recent suicidal or homicidal ideation.  Safety assessment was done and patient reports feeling safe and has no prior history of suicide attempt.  Patient understand the importance of reaching out and calling 911 or going to nearest emergency room if any worsening of suicidal ideation or safety concern is noted.  Denies current or past history of kevon, hypomania or psychosis.  Patient to have associated anxiety but reports keeping emotions to self and that causes buildup of anger inside but has never acted on his anger before.  Patient is engaged in psychotherapy and agreed with plan of initiating Lexapro but understand the importance of combining medication with psychotherapy.  Patient also report poor sleep but reports snoring and not feeling refreshed.  His primary care has done referral for sleep medicine and was motivated to follow on that and get evaluated by sleep medicine to rule out sleep apnea related sleep disorders.      PSYCHIATRIC REVIEW OF SYSTEMS: denies manic symptoms, denies psychotic symptoms including AH / VH, denies OCD symptoms, " denies restrictive eating or purging, denies trauma related symptoms, see HPI for depressive symptoms and see HPI for anxeity symptoms      MEDICAL REVIEW OF SYSTEMS:   Constitutional negative   Eyes negative   Ears/Nose/Mouth/Throat negative   Cardiovascular  HTN   Respiratory negative   Gastrointestinal negative   Genitourinary negative   Muscular negative   Integumentary negative   Neurological negative   Endocrine negative   Hematologic/Lymphatic negative     CURRENT MEDICATIONS:  Current Outpatient Medications   Medication Sig Dispense Refill   • losartan-hydrochlorothiazide (HYZAAR) 100-25 MG per tablet TAKE ONE TABLET BY MOUTH ONCE A DAY 30 Tablet 2   • Aspirin-Acetaminophen-Caffeine (EXCEDRIN PO) Take  by mouth.       No current facility-administered medications for this visit.       ALLERGIES:  Patient has no known allergies.    PAST PSYCHIATRIC HISTORY  Prior psychiatric hospitalization: no  Prior Self harm/suicide attempt: no  Prior Diagnosis: Depression, ADHD    PAST PSYCHIATRIC MEDICATIONS  • Ritalin     FAMILY HISTORY  Psychiatric diagnosis:  no  History of suicide attempts:  no  Substance abuse history:  no    SUBSTANCE USE HISTORY:  History of excessive alcohol abuse with blackout in his 20s  Reports occasional alcohol use now and denies any drug abuse    SOCIAL HISTORY  Born in [place]  Currently in [place]  Working for [employer]   with no kids  Describes poor relationship with father    MEDICAL HISTORY  Past Medical History:   Diagnosis Date   • Head ache      Past Surgical History:   Procedure Laterality Date   • APPENDECTOMY           PHYSICAL EXAMINAION:  Vital signs: There were no vitals taken for this visit.  Musculoskeletal: Normal gait.   Abnormal movements: none    MENTAL STATUS EXAMINATION      General:   - Grooming and hygiene: Casual,   - Apparent distress: none,   - Behavior: Tense  - Eye Contact:  Limited,   - no psychomotor agitation or retardation    - Participation: Active  verbal participation  Orientation: Alert and Fully Oriented to person, place and time  Mood: Depressed and Anxious  Affect: Constricted,  Thought Process: Logical and Goal-directed  Thought Content: Denies suicidal or homicidal ideations, intent or plan Within normal limits  Perception: Denies auditory or visual hallucinations. No delusions noted Within normal limits  Attention span and concentration: Intact   Speech:Rate within normal limits and Volume within normal limits  Language: Appropriate   Insight: Good  Judgment: Good  Recent and remote memory: No gross evidence of memory deficits      DEPRESSION SCREENING:  Depression Screen (PHQ-2/PHQ-9) 5/13/2021 10/7/2021   PHQ-2 Total Score 1 6   PHQ-9 Total Score 6 14       Interpretation of PHQ-9 Total Score   Score Severity   1-4 No Depression   5-9 Mild Depression   10-14 Moderate Depression   15-19 Moderately Severe Depression   20-27 Severe Depression      SAFETY ASSESSMENT - SELF:    Does patient acknowledge current or past symptoms of dangerousness to self? no  History of suicide by family member: no  History of suicide by friend/significant other: no  Recent change in amount/specificity/intensity of suicidal thoughts or self-harm behavior? no  Current access to firearms, medications, or other identified means of suicide/self-harm? no  Protective factors present: wife, work, family       SAFETY ASSESSMENT - OTHERS:    Does patient acknowledge current or past symptoms of aggressive behavior or risk to others? no  Recent change in amount/specificity/intensity of thoughts or threats to harm others? no  Current access to firearms/other identified means of harm? no      CURRENT RISK:       Suicidal: Low       Homicidal: Low       Self-Harm: Low       Relapse: Low       Crisis Safety Plan Reviewed Not Indicated    MEDICAL RECORDS/LABS/DIAGNOSTIC TESTS REVIEWED:  Ref Range & Units 5 mo ago    TSH 0.380 - 5.330 uIU/mL 1.530      Component      Latest Ref Rng & Units  5/22/2021           7:39 AM   Sodium      135 - 145 mmol/L 141   Potassium      3.6 - 5.5 mmol/L 4.2   Chloride      96 - 112 mmol/L 105   Co2      20 - 33 mmol/L 26   Anion Gap      7.0 - 16.0 10.0   Glucose      65 - 99 mg/dL 83   Bun      8 - 22 mg/dL 14   Creatinine      0.50 - 1.40 mg/dL 0.90   Calcium      8.5 - 10.5 mg/dL 9.4   AST(SGOT)      12 - 45 U/L 32   ALT(SGPT)      2 - 50 U/L 38   Alkaline Phosphatase      30 - 99 U/L 85   Total Bilirubin      0.1 - 1.5 mg/dL 0.4   Albumin      3.2 - 4.9 g/dL 4.5   Total Protein      6.0 - 8.2 g/dL 7.6   Globulin      1.9 - 3.5 g/dL 3.1   A-G Ratio      g/dL 1.5     Component      Latest Ref Rng & Units 5/22/2021           7:39 AM   WBC      4.8 - 10.8 K/uL 6.1   RBC      4.70 - 6.10 M/uL 5.39   Hemoglobin      14.0 - 18.0 g/dL 14.4   Hematocrit      42.0 - 52.0 % 45.5   MCV      81.4 - 97.8 fL 84.4   MCH      27.0 - 33.0 pg 26.7 (L)   MCHC      33.7 - 35.3 g/dL 31.6 (L)   RDW      35.9 - 50.0 fL 41.0   Platelet Count      164 - 446 K/uL 277   MPV      9.0 - 12.9 fL 9.4   Neutrophils-Polys      44.00 - 72.00 % 54.90   Lymphocytes      22.00 - 41.00 % 29.70   Monocytes      0.00 - 13.40 % 9.60   Eosinophils      0.00 - 6.90 % 4.00   Basophils      0.00 - 1.80 % 1.50   Immature Granulocytes      0.00 - 0.90 % 0.30   Nucleated RBC      /100 WBC 0.00   Neutrophils (Absolute)      1.82 - 7.42 K/uL 3.34   Lymphs (Absolute)      1.00 - 4.80 K/uL 1.80   Monos (Absolute)      0.00 - 0.85 K/uL 0.58   Eos (Absolute)      0.00 - 0.51 K/uL 0.24   Baso (Absolute)      0.00 - 0.12 K/uL 0.09   Immature Granulocytes (abs)      0.00 - 0.11 K/uL 0.02   NRBC (Absolute)      K/uL 0.00       [place]  records -   Reviewed      DIFFERENTIAL DIAGNOSES  1. MDD      PLAN:  (1) MDD  • Add Lexapro 5 mg daily for 1 week and then increase to 10 mg daily for mood and anxiety management.  30 tabs with 1 refill given.  • Continue psychotherapy for mood and anxiety management.  • Medication options,  alternatives (including no medications) and medication risks/benefits/side effects were discussed in detail.  • Explained importance of contraceptive measures while on psychotropic medications, educated to let provider know if ever pregnant or wanting to become pregnant. Verbalized understanding.  • The patient was advised to call, message provider on MyChart, or come in to the clinic if symptoms worsen or if any future questions/issues regarding their medications arise; the patient verbalized understanding and agreement.    • The patient was educated to call 911, call the suicide hotline, or go to local ER if having thoughts of suicide or homicide; verbalized understanding.    [id number]: Based on total 60 minutes spent on evaluation, chart review and documentation.    Return to clinic in 6 weeks or sooner if symptoms worsen.  Next Appointment:  instruction provided on how to make the next appointment.     The proposed treatment plan was discussed with the patient who was provided the opportunity to ask questions and make suggestions regarding alternative treatment. Patient verbalized understanding and expressed agreement with the plan.     Thank you for allowing me to participate in the care of this patient.    Buddy Bernard M.D.  11/10/21    CC:   Mitzy Wiggins P.A.-C.    This note was created using voice recognition software (Dragon). The accuracy of the dictation is limited by the abilities of the software. I have reviewed the note prior to signing, however some errors in grammar and context are still possible. If you have any questions related to this note please do not hesitate to contact our office.

## 2021-11-15 ENCOUNTER — APPOINTMENT (OUTPATIENT)
Dept: BEHAVIORAL HEALTH | Facility: CLINIC | Age: 31
End: 2021-11-15
Payer: COMMERCIAL

## 2021-12-09 ENCOUNTER — OFFICE VISIT (OUTPATIENT)
Dept: BEHAVIORAL HEALTH | Facility: CLINIC | Age: 31
End: 2021-12-09
Payer: COMMERCIAL

## 2021-12-09 DIAGNOSIS — F33.1 MODERATE EPISODE OF RECURRENT MAJOR DEPRESSIVE DISORDER (HCC): ICD-10-CM

## 2021-12-09 PROCEDURE — 90847 FAMILY PSYTX W/PT 50 MIN: CPT | Performed by: MARRIAGE & FAMILY THERAPIST

## 2021-12-10 NOTE — PROGRESS NOTES
[facility]   Therapy Progress Note        Name: You Sumner  MRN: [id number]  : 1990  Age: 31 y.o.  Date of assessment: 12/10/2021  PCP: Mitzy Wiggins P.A.-C.  Persons in attendance: Patient and Spouse/Partner  Total session time: 45 minutes      Topics addressed in psychotherapy include: Met with the patient and his wife Rebeca, for a couple’s counseling session.  Initially the couple reported significant improvement in their relationship.  Discussed with the couple an incident which occurred this past thanksgi.  Worked through details and identified behaviors to change.  Worked with patient as it became quiet in session.  Discussed “help me understand” and means to increase communication.  This dictation has been created using voice recognition software and/or scribes. The accuracy of the dictation is limited by the abilities of the software and the expertise of the scribes. I expect there may be some errors of grammar and possibly content. I made every attempt to manually correct the errors within my dictation. However, errors related to voice recognition software and/or scribes may still exist and should be interpreted within the appropriate context.*    Objective Observations:   Participation:Active verbal participation, Attentive, Engaged, Open to feedback and Guarded   Grooming:Casual   Cognition:Alert and Fully Oriented   Eye Contact:Good   Mood:Anxious   Affect:Flexible, Full range, Expansive, Congruent with content, Anxious and Bright   Thought Process:Logical and Goal-directed   Speech:Rate within normal limits and Volume within normal limits    Current Risk:   Suicide: low   Homicide: low   Self-Harm: low   Relapse: low   Safety Plan Reviewed: not applicable    Care Plan Updated: No    Does patient express agreement with the above plan? Yes     Diagnosis:  1. Moderate episode of recurrent major depressive disorder (HCC)        Referral appointment(s)  scheduled? No       BARRINGTON Dickson.

## 2021-12-14 ENCOUNTER — OFFICE VISIT (OUTPATIENT)
Dept: BEHAVIORAL HEALTH | Facility: CLINIC | Age: 31
End: 2021-12-14
Payer: COMMERCIAL

## 2021-12-14 DIAGNOSIS — F33.1 MODERATE EPISODE OF RECURRENT MAJOR DEPRESSIVE DISORDER (HCC): ICD-10-CM

## 2021-12-14 PROCEDURE — 90837 PSYTX W PT 60 MINUTES: CPT | Performed by: SOCIAL WORKER

## 2021-12-15 NOTE — PROGRESS NOTES
[facility]   Therapy Progress Note        Name: You Sumner  MRN: [id number]  : 1990  Age: 31 y.o.  Date of assessment: 2021  PCP: Mitzy Wiggins P.A.-C.  Persons in attendance: Patient  Total session time: 58minutes      Topics addressed in psychotherapy include: Leroy reports that he is on Lexapro now. He reports that he feels he is less on edge now and his friends have noticed he appears to be having more fun again.  He is doing couple's counseling with his wife and this is also helping him to use the techniques discussed in communication style. He is finding that as he makes time for his wife he is also gaining some time for himself.  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 He finds that they have had to cover a lot of different responsibilities with little compensation. Although, he reports it helps that his wife is now working again. They have worked out that for now her pay will cover the holiday expenses and some eating out.      Objective Observations:   Participation:Active verbal participation   Grooming:Neat   Cognition:Alert   Eye Contact:Good   Mood:stable, pleasent   Affect:Congruent with content   Thought Process:Logical and Goal-directed   Speech:Rate within normal limits and Volume within normal limits    Current Risk:   Suicide: not indicated   Homicide: not indicated   Self-Harm: not indicated   Relapse: not indicated   Safety Plan Reviewed: not applicable    Care Plan Updated: No    Does patient express agreement with the above plan? Yes     Diagnosis:  1. Moderate episode of recurrent major depressive disorder (HCC)        Referral appointment(s) scheduled? Yes       Donna Bruce L.C.S.W.

## 2021-12-22 ENCOUNTER — APPOINTMENT (OUTPATIENT)
Dept: BEHAVIORAL HEALTH | Facility: CLINIC | Age: 31
End: 2021-12-22
Payer: COMMERCIAL

## 2021-12-23 ENCOUNTER — TELEMEDICINE (OUTPATIENT)
Dept: BEHAVIORAL HEALTH | Facility: CLINIC | Age: 31
End: 2021-12-23
Payer: COMMERCIAL

## 2021-12-23 DIAGNOSIS — F32.1 CURRENT MODERATE EPISODE OF MAJOR DEPRESSIVE DISORDER, UNSPECIFIED WHETHER RECURRENT (HCC): ICD-10-CM

## 2021-12-23 DIAGNOSIS — F33.41 RECURRENT MAJOR DEPRESSIVE DISORDER, IN PARTIAL REMISSION (HCC): ICD-10-CM

## 2021-12-23 PROBLEM — F33.9 EPISODE OF RECURRENT MAJOR DEPRESSIVE DISORDER (HCC): Status: RESOLVED | Noted: 2021-05-13 | Resolved: 2021-12-23

## 2021-12-23 PROCEDURE — 99214 OFFICE O/P EST MOD 30 MIN: CPT | Mod: 95 | Performed by: PSYCHIATRY & NEUROLOGY

## 2021-12-23 PROCEDURE — 90833 PSYTX W PT W E/M 30 MIN: CPT | Mod: 95 | Performed by: PSYCHIATRY & NEUROLOGY

## 2021-12-23 RX ORDER — ESCITALOPRAM OXALATE 10 MG/1
10 TABLET ORAL
Qty: 90 TABLET | Refills: 1 | Status: SHIPPED | OUTPATIENT
Start: 2021-12-23 | End: 2022-07-19

## 2021-12-23 NOTE — PROGRESS NOTES
This evaluation was conducted via [employer] using secure and encrypted videoconferencing technology. The patient was in a private location in the [facility].    The patient's identity was confirmed and verbal consent was obtained for this virtual visit.     PSYCHIATRY FOLLOW-UP NOTE      Name: You Sumner  MRN: [id number]  : 1990  Age: 31 y.o.  Date of assessment: 2021  PCP: Mitzy Wiggins P.A.-C.  Persons in attendance: Patient      REASON FOR VISIT/CHIEF COMPLAINT (as stated by Patient):  You Sumner is a 31 y.o., White male, attending follow-up appointment for mood and anxiety management.      HISTORY OF PRESENT ILLNESS:  You Sumner is a 31 y.o. old male with MDD comes in today for follow up. Patient was last seen 1 month ago, and following treatment planning recommendations were done:  · Add Lexapro 5 mg daily for 1 week and then increase to 10 mg daily for mood and anxiety management.  30 tabs with 1 refill given.  · Continue psychotherapy for mood and anxiety management.    Patient is compliant with medications with no side effects and reports stable mood and anxiety and gave examples where he, his wife and friends have noticed good response.  Patient in agreement with continuing Lexapro at same dosage and continuing psychotherapy.    PSYCHOTHERAPY ASPECT OF SESSION (16 MIN):  • Most part of the session was dedicated to letting patient express his feelings related to social stressors, primarily in relation to his relationship with father.  Validation was provided for appropriate emotional responses and normalization was done.  • Importance of  appropriate from intrusive emotional reactivity was emphasized.  • Most session was dedicated to active listening and implementing supportive psychotherapy skills.      CURRENT MEDICATIONS:  Current Outpatient Medications   Medication Sig Dispense Refill   • escitalopram (LEXAPRO) 10 MG Tab  Take 1 Tablet by mouth 1/2 hour after dinner. (begin with half tab for one week and then increase to one full tab daily) 30 Tablet 1   • losartan-hydrochlorothiazide (HYZAAR) 100-25 MG per tablet TAKE ONE TABLET BY MOUTH ONCE A DAY 30 Tablet 2   • Aspirin-Acetaminophen-Caffeine (EXCEDRIN PO) Take  by mouth.       No current facility-administered medications for this visit.       MEDICAL HISTORY  Past Medical History:   Diagnosis Date   • Head ache      Past Surgical History:   Procedure Laterality Date   • APPENDECTOMY         PAST PSYCHIATRIC HISTORY  Prior psychiatric hospitalization: no  Prior Self harm/suicide attempt: no  Prior Diagnosis: Depression, ADHD     PAST PSYCHIATRIC MEDICATIONS  · Ritalin      FAMILY HISTORY  Psychiatric diagnosis:  no  History of suicide attempts:  no  Substance abuse history:  no     SUBSTANCE USE HISTORY:  History of excessive alcohol abuse with елена in his 20s  Reports occasional alcohol use now and denies any drug abuse     SOCIAL HISTORY  Born in [place]  Currently in [employer]  Working for [employer]   with no kids  Describes poor relationship with father      REVIEW OF SYSTEMS:        Constitutional negative   Eyes negative   Ears/Nose/Mouth/Throat negative   Cardiovascular negative   Respiratory negative   Gastrointestinal negative   Genitourinary negative   Muscular negative   Integumentary negative   Neurological negative   Endocrine negative   Hematologic/Lymphatic negative     PHYSICAL EXAMINAION:  Vital signs: There were no vitals taken for this visit.  Musculoskeletal: Normal gait.   Abnormal movements: none      MENTAL STATUS EXAMINATION      General:   - Grooming and hygiene: Casual,   - Apparent distress: none,   - Behavior: Calm  - Eye Contact:  Good,   - no psychomotor agitation or retardation    - Participation: Active verbal participation  Orientation: Alert and Fully Oriented to person, place and time  Mood: Euthymic  Affect: Flexible and Full  range,  Thought Process: Logical and Goal-directed  Thought Content: Denies suicidal or homicidal ideations, intent or plan Within normal limits  Perception: Denies auditory or visual hallucinations. No delusions noted Within normal limits  Attention span and concentration: Intact   Speech:Rate within normal limits and Volume within normal limits  Language: Appropriate   Insight: Good  Judgment: Good  Recent and remote memory: No gross evidence of memory deficits        DEPRESSION SCREENING:  Depression Screen (PHQ-2/PHQ-9) 5/13/2021 10/7/2021   PHQ-2 Total Score 1 6   PHQ-9 Total Score 6 14       Interpretation of PHQ-9 Total Score   Score Severity   1-4 No Depression   5-9 Mild Depression   10-14 Moderate Depression   15-19 Moderately Severe Depression   20-27 Severe Depression    CURRENT RISK:       Suicidal: Low       Homicidal: Low       Self-Harm: Low       Relapse: Low       Crisis Safety Plan Reviewed Not Indicated       If evidence of imminent risk is present, intervention/plan:      MEDICAL RECORDS/LABS/DIAGNOSTIC TESTS REVIEWED:  No new lab since last visit     [place]  records -   Reviewed       DIAGNOSTIC IMPRESSION(S):  1. MDD        PLAN:  (1) MDD  · Improving  · Continue Lexapro 10 mg daily for mood and anxiety management.  90 tabs with 1 refill given.  · Continue psychotherapy for mood and anxiety management.  · Medication options, alternatives (including no medications) and medication risks/benefits/side effects were discussed in detail.  · Explained importance of contraceptive measures while on psychotropic medications, educated to let provider know if ever pregnant or wanting to become pregnant. Verbalized understanding.  · The patient was advised to call, message provider on [employer], or come in to the clinic if symptoms worsen or if any future questions/issues regarding their medications arise; the patient verbalized understanding and agreement.    · The patient was educated to call 911, call the  suicide hotline, or go to local ER if having thoughts of suicide or homicide; verbalized understanding.    Billing Coding based on:  84225: based on [facility]  [id number]: based on psychotherapy timing    Return to clinic in 2 months or sooner if symptoms worsen.  Next Appointment: instruction provided on how to make the next appointment.     The proposed treatment plan was discussed with the patient who was provided the opportunity to ask questions and make suggestions regarding alternative treatment. Patient verbalized understanding and expressed agreement with the plan.       Buddy Bernard M.D.  12/23/21    This note was created using voice recognition software (Dragon). The accuracy of the dictation is limited by the abilities of the software. I have reviewed the note prior to signing, however some errors in grammar and context are still possible. If you have any questions related to this note please do not hesitate to contact our office.

## 2022-01-14 ENCOUNTER — APPOINTMENT (OUTPATIENT)
Dept: BEHAVIORAL HEALTH | Facility: CLINIC | Age: 32
End: 2022-01-14
Payer: COMMERCIAL

## 2022-01-27 ENCOUNTER — OFFICE VISIT (OUTPATIENT)
Dept: BEHAVIORAL HEALTH | Facility: CLINIC | Age: 32
End: 2022-01-27
Payer: COMMERCIAL

## 2022-01-27 DIAGNOSIS — F33.41 RECURRENT MAJOR DEPRESSIVE DISORDER, IN PARTIAL REMISSION (HCC): ICD-10-CM

## 2022-01-27 PROCEDURE — 90837 PSYTX W PT 60 MINUTES: CPT | Mod: 95 | Performed by: SOCIAL WORKER

## 2022-01-27 ASSESSMENT — PATIENT HEALTH QUESTIONNAIRE - PHQ9
5. POOR APPETITE OR OVEREATING: 1 - SEVERAL DAYS
SUM OF ALL RESPONSES TO PHQ QUESTIONS 1-9: 5
CLINICAL INTERPRETATION OF PHQ2 SCORE: 1

## 2022-01-27 NOTE — PROGRESS NOTES
"[facility]   Therapy Progress Note      This visit was conducted via Zoom using secure and encrypted videoconferencing technology.  The patient was in a private location in the [facility].  The patient's identity was confirmed and verbal consent was obtained for this virtual visit.      Name: You Sumner  MRN: [id number]  : 1990  Age: 31 y.o.  Date of assessment: 2022  PCP: Mitzy Wiggins P.A.-C.  Persons in attendance: Patient  Total session time: 56minutes    Objective Observations:   Participation:Active verbal participation   Grooming:Neat   Cognition:Alert   Eye Contact:Good   Mood:tired, hard to stay on track.   Affect:tired   Thought Process:Logical and Goal-directed   Speech:Rate within normal limits and Volume within normal limits    Current Risk:   Suicide: not indicated   Homicide: not indicated   Self-Harm: not indicated   Relapse: not indicated   Safety Plan Reviewed: not applicable    Topics addressed in psychotherapy include: \" Really tough day. Looks very tired but did not want to miss appointments. Holidays were quiet, wife was in hospital with concussion on New Year's. \" I have been working on being more calm and rational but does not happen when she is having a moment.. She is learning more about how her behavior is impacting them. I have been telling her how I want to spend time with her\".    Her new job is doing better for her.She is getting approval from her management for the job she is doing. His job is doing some better. Improving his relationship has helped his not wanting to run to work for an escape.  Depression score low, but does have issues around feeling bad himself, usually relating to his wife. I try to give loving gesture but it sometimes because of her condition it will hurt her and he feels bad.  Wife is willing to try things out that will help us.  Would agree that the anxious has tapered off.   Had a discussion with Dr. Bernard " about his medications and it is working so both agreed that he does not need to increase at this time. Discussion about his father and his holding back and not telling his father why he is not responding to him.    Care Plan Updated: No    Does patient express agreement with the above plan? Yes     Diagnosis:  1. Recurrent major depressive disorder, in partial remission (HCC)        Referral appointment(s) scheduled? Yes       Donna Bruce L.C.S.W.

## 2022-01-28 DIAGNOSIS — I10 BENIGN ESSENTIAL HTN: ICD-10-CM

## 2022-01-28 RX ORDER — LOSARTAN POTASSIUM AND HYDROCHLOROTHIAZIDE 25; 100 MG/1; MG/1
TABLET ORAL
Qty: 30 TABLET | Refills: 2 | Status: SHIPPED | OUTPATIENT
Start: 2022-01-28 | End: 2023-04-13

## 2022-02-11 ENCOUNTER — OFFICE VISIT (OUTPATIENT)
Dept: BEHAVIORAL HEALTH | Facility: CLINIC | Age: 32
End: 2022-02-11
Payer: COMMERCIAL

## 2022-02-11 DIAGNOSIS — Z71.89 SEEN BY MARRIAGE GUIDANCE COUNSELOR: ICD-10-CM

## 2022-02-11 DIAGNOSIS — F33.41 RECURRENT MAJOR DEPRESSIVE DISORDER, IN PARTIAL REMISSION (HCC): ICD-10-CM

## 2022-02-11 PROCEDURE — 90847 FAMILY PSYTX W/PT 50 MIN: CPT | Performed by: MARRIAGE & FAMILY THERAPIST

## 2022-02-24 PROBLEM — Z71.89 SEEN BY MARRIAGE GUIDANCE COUNSELOR: Status: ACTIVE | Noted: 2022-02-24

## 2022-02-25 NOTE — PROGRESS NOTES
[facility]   Therapy Progress Note        Name: You Sumner  MRN: [id number]  : 1990  Age: 31 y.o.  Date of assessment: 2022  PCP: Mitzy Wiggins P.A.-C.  Persons in attendance: Patient and Spouse/Partner  Total session time: 55 minutes      Topics addressed in psychotherapy include:   Met with the patient and his wife, Rebeca, for a couple’s counseling session.  The couple discussed recent concerns regarding the relationship with their roommate who happens to also be the patients sister.  There appears to be A differentiation between the sister relationship and the roommate relationship.  Rebeca feels that she cannot ask the roommate to clean up after herself because Leroy would be protecting his sister.  Worked with the couple on problem solving skills.  As a session appeared to be losing focus, worked with the couple on mindfulness techniques by playing catch with a sound.  If it appears that Leroy would like to have acknowledge that he is giving as much as he can in their relationship.  Rebeca recognizes that he is trying.  Worked with the couple on validation skills.    This dictation has been created using voice recognition software and/or scribes. The accuracy of the dictation is limited by the abilities of the software and the expertise of the scribes. I expect there may be some errors of grammar and possibly content. I made every attempt to manually correct the errors within my dictation. However, errors related to voice recognition software and/or scribes may still exist and should be interpreted within the appropriate context.    Objective Observations:   Participation:Active verbal participation, Attentive and Engaged   Grooming:Casual   Cognition:Alert and Fully Oriented   Eye Contact:Limited   Mood:Euthymic and Anxious   Affect:Flexible, Congruent with content and Anxious   Thought Process:Logical and Goal-directed   Speech:Rate within normal limits and  Volume within normal limits    Current Risk:   Suicide: low   Homicide: low   Self-Harm: low   Relapse: low   Safety Plan Reviewed: not applicable    Care Plan Updated: No    Does patient express agreement with the above plan? Yes     Diagnosis:  1. Recurrent major depressive disorder, in partial remission (HCC)    2. Seen by marriage guidance counselor        Referral appointment(s) scheduled? BARRINGTON Lam.

## 2022-03-01 NOTE — PROGRESS NOTES
[facility]   Therapy Progress Note        Name: You Sumner  MRN: [id number]  : 1990  Age: 31 y.o.  Date of assessment: 2022  PCP: Mitzy Wiggins P.A.-C.  Persons in attendance: Patient and Spouse/Partner  Total session time: 55   minutes      Topics addressed in psychotherapy include: Met with the patient and his wife Rebeca, for a couple’s counseling session.Worked with a couple on establishing therapeutic goals.  Rebeca reported that she would like to be able to have conversations, be heard and valued and have trust in their relationship.  Leroy reported that wants to come home and relax after work.  Sometimes he feels like he is on the spot when he gets home and would like some downtime.  Worked with couple to find a happy medium where all needs are met.    This dictation has been created using voice recognition software and/or scribes. The accuracy of the dictation is limited by the abilities of the software and the expertise of the scribes. I expect there may be some errors of grammar and possibly content. I made every attempt to manually correct the errors within my dictation. However, errors related to voice recognition software and/or scribes may still exist and should be interpreted within the appropriate context.    Objective Observations:   Participation:Active verbal participation, Attentive and Engaged   Grooming:Casual   Cognition:Alert and Fully Oriented   Eye Contact:Good   Mood:Anxious   Affect:Flexible and Full range   Thought Process:Logical and Goal-directed   Speech:Rate within normal limits and Volume within normal limits    Current Risk:   Suicide: low   Homicide: low   Self-Harm: low   Relapse: low   Safety Plan Reviewed: not applicable    Care Plan Updated: No    Does patient express agreement with the above plan? Yes     Diagnosis:  1. Recurrent major depressive disorder, in partial remission (HCC)    2. Seen by marriage guidance counselor         Referral appointment(s) scheduled? No       BARRINGTON Dickson.

## 2022-03-08 ENCOUNTER — OFFICE VISIT (OUTPATIENT)
Dept: BEHAVIORAL HEALTH | Facility: CLINIC | Age: 32
End: 2022-03-08
Payer: COMMERCIAL

## 2022-03-08 DIAGNOSIS — F33.41 RECURRENT MAJOR DEPRESSIVE DISORDER, IN PARTIAL REMISSION (HCC): ICD-10-CM

## 2022-03-08 PROCEDURE — 90837 PSYTX W PT 60 MINUTES: CPT | Mod: GT | Performed by: SOCIAL WORKER

## 2022-03-08 NOTE — PROGRESS NOTES
"[facility]   Therapy Progress Note      This visit was conducted via Zoom using secure and encrypted videoconferencing technology.  The patient was in a private location in the [facility].  The patient's identity was confirmed and verbal consent was obtained for this virtual visit.  Place of Service:{Desc; POS 10 -The patient is at Home during their visit    Name: You Sumner  MRN: [id number]  : 1990  Age: 31 y.o.  Date of assessment: 3/8/2022  PCP: Mitzy Wiggins P.A.-C.  Persons in attendance: Patient  Total session time: 55minutes    Objective Observations:   Participation:Active verbal participation   Grooming:Neat   Cognition:Alert   Eye Contact:Good   Mood:Euthymic   Affect:Congruent with content   Thought Process:Logical and Goal-directed   Speech:Rate within normal limits and Volume within normal limits    Current Risk:   Suicide: not indicated   Homicide: not indicated   Self-Harm: not indicated   Relapse: not indicated   Safety Plan Reviewed: not applicable    Topics addressed in psychotherapy include: \" I am all right. It has been a few stupid days at work. We have so high uppers coming in and we have to have it clean and spotless for the dog and pony show.  Things at home day by day feeling a little better. Able to have adequately express my thoughts.to her without getting angry.  I have no issue just being near her. She does not view this as quality time, usually for her it involves money.   Discussed how can we move her out of the idea he is the only person who makes the decisions.    Care Plan Updated: Yes    Does patient express agreement with the above plan? Yes     Diagnosis:  1. Recurrent major depressive disorder, in partial remission (HCC)        Referral appointment(s) scheduled? patient will call       Donna Bruce L.C.S.W.    "

## 2022-03-29 ENCOUNTER — OFFICE VISIT (OUTPATIENT)
Dept: BEHAVIORAL HEALTH | Facility: CLINIC | Age: 32
End: 2022-03-29
Payer: COMMERCIAL

## 2022-03-29 DIAGNOSIS — F33.41 RECURRENT MAJOR DEPRESSIVE DISORDER, IN PARTIAL REMISSION (HCC): ICD-10-CM

## 2022-03-29 PROCEDURE — 90837 PSYTX W PT 60 MINUTES: CPT | Mod: GT | Performed by: SOCIAL WORKER

## 2022-03-29 NOTE — PROGRESS NOTES
"[facility]   Therapy Progress Note      This visit was conducted via Zoom using secure and encrypted videoconferencing technology.  The patient was in a private location in the [facility].  The patient's identity was confirmed and verbal consent was obtained for this virtual visit.  Place of Service:{Desc; POS 10 -The patient is at Home during their visit    Name: You Sumner  MRN: [id number]  : 1990  Age: 31 y.o.  Date of assessment: 3/29/2022  PCP: Mitzy Wiggins P.A.-C.  Persons in attendance: Patient  Total session time:55 minutes    Objective Observations:   Participation:Active verbal participation   Grooming:Casual   Cognition:Alert   Eye Contact:Good   Mood:Euthymic   Affect:Congruent with content   Thought Process:Logical and Goal-directed   Speech:Rate within normal limits and Volume within normal limits    Current Risk:   Suicide: not indicated   Homicide: not indicated   Self-Harm: not indicated   Relapse: not indicated   Safety Plan Reviewed: not applicable    Topics addressed in psychotherapy include: \" Half centering myself after work, it was easy but at the end of it just need to center. Putting in time for vacation in . Had a rough night with wife and I said we have to talk about and we talked in a park. It was happening right before I go to bed, where does it always happen that way, she said it is the only time I can corner you. It worked out well, negative feelings left in the park. Or do a once a week thing where we have dinner in the park. We had serious talk about why are we together if you feel that but we cleared it up.    Whenever we have issues I second guess myself. Talked a lot of failure feelings then hyper focused on future.   Care Plan Updated: No    Does patient express agreement with the above plan? Yes     Diagnosis:  1. Recurrent major depressive disorder, in partial remission (HCC)        Referral appointment(s) scheduled? will " PRABHAKAR ChildersSCHRISTIANO

## 2022-03-30 ENCOUNTER — OFFICE VISIT (OUTPATIENT)
Dept: MEDICAL GROUP | Facility: LAB | Age: 32
End: 2022-03-30
Payer: COMMERCIAL

## 2022-03-30 VITALS
HEIGHT: 76 IN | RESPIRATION RATE: 18 BRPM | OXYGEN SATURATION: 96 % | SYSTOLIC BLOOD PRESSURE: 132 MMHG | DIASTOLIC BLOOD PRESSURE: 72 MMHG | HEART RATE: 82 BPM | TEMPERATURE: 97.7 F | BODY MASS INDEX: 38.36 KG/M2 | WEIGHT: 315 LBS

## 2022-03-30 DIAGNOSIS — Z91.89 AT RISK FOR SLEEP APNEA: ICD-10-CM

## 2022-03-30 DIAGNOSIS — R05.9 COUGH: ICD-10-CM

## 2022-03-30 PROCEDURE — 99213 OFFICE O/P EST LOW 20 MIN: CPT | Performed by: PHYSICIAN ASSISTANT

## 2022-03-30 RX ORDER — ALBUTEROL SULFATE 90 UG/1
2 AEROSOL, METERED RESPIRATORY (INHALATION) EVERY 4 HOURS PRN
Qty: 1 EACH | Refills: 0 | Status: SHIPPED | OUTPATIENT
Start: 2022-03-30

## 2022-03-30 ASSESSMENT — FIBROSIS 4 INDEX: FIB4 SCORE: 0.56

## 2022-03-30 NOTE — ASSESSMENT & PLAN NOTE
New to me; cough since 03/18/2022.  Improving some.   Denies SOB  Some wheeze - more after exertion; nothing recent.  Reports fever on 03/19/2022 - 100.8.   No other fevers/chills.

## 2022-03-31 NOTE — PROGRESS NOTES
"Subjective:   CC: You Sumner is a 31 y.o. male here today for cough x2 weeks.     HPI:  Cough  New to me; cough since 03/18/2022.  Improving some.   Denies SOB  Some wheeze - more after exertion; nothing recent.  Reports fever on 03/19/2022 - 100.8.   No other fevers/chills.          Current medicines (including changes today)  Current Outpatient Medications   Medication Sig Dispense Refill   • albuterol 108 (90 Base) MCG/ACT Aero Soln inhalation aerosol Inhale 2 Puffs every four hours as needed for Shortness of Breath. 1 Each 0   • losartan-hydrochlorothiazide (HYZAAR) 100-25 MG per tablet TAKE ONE TABLET BY MOUTH ONCE A DAY 30 Tablet 2   • escitalopram (LEXAPRO) 10 MG Tab Take 1 Tablet by mouth 1/2 hour after dinner. 90 Tablet 1   • Aspirin-Acetaminophen-Caffeine (EXCEDRIN PO) Take  by mouth.       No current facility-administered medications for this visit.     He  has a past medical history of Head ache and Seen by marriage guidance counselor (2/24/2022).    ROS   No chest pain, no shortness of breath, no abdominal pain       Objective:     /72 (BP Location: Left arm, Patient Position: Sitting, BP Cuff Size: Large adult)   Pulse 82   Temp 36.5 °C (97.7 °F) (Temporal)   Resp 18   Ht 1.93 m (6' 4\")   Wt (!) 147 kg (324 lb 11.2 oz)   SpO2 96%  Body mass index is 39.52 kg/m².   Physical Exam:  Constitutional: Alert, no distress.  Skin: Warm, dry, good turgor, no rashes in visible areas.  Eye: Equal, round, conjunctiva clear, lids normal.  Neck: Trachea midline, no masses, no thyromegaly. No cervical or supraclavicular lymphadenopathy  Respiratory: Unlabored respiratory effort, lungs clear to auscultation, no wheezes, no ronchi.  Cardiovascular: Normal S1, S2, no murmur, no edema.  Psych: Alert and oriented x3, normal affect and mood.    Assessment and Plan:   The following treatment plan was discussed    1. Cough  Recommend use of albuterol.  Also diagnosed him to use an allergy " medication such as Allegra/Claritin/Allegra for seasonal allergies.  Pt was educated on use and SEs of medication.  Continue to monitor symptoms, follow-up in 1 to 2 weeks if no improvement with allergy medication or as needed use of albuterol.  - albuterol 108 (90 Base) MCG/ACT Aero Soln inhalation aerosol; Inhale 2 Puffs every four hours as needed for Shortness of Breath.  Dispense: 1 Each; Refill: 0    2. At risk for sleep apnea  Continue to recommend he follow up for sleep study.   - Referral to Pulmonary and Sleep Medicine      Followup: Return if symptoms worsen or fail to improve.       Mitzy Wiggins P.A.-C.  Supervising MD: Dr. Sven Melvin MD  03/31/22

## 2022-04-15 ENCOUNTER — OFFICE VISIT (OUTPATIENT)
Dept: BEHAVIORAL HEALTH | Facility: CLINIC | Age: 32
End: 2022-04-15
Payer: COMMERCIAL

## 2022-04-15 DIAGNOSIS — F33.41 RECURRENT MAJOR DEPRESSIVE DISORDER, IN PARTIAL REMISSION (HCC): ICD-10-CM

## 2022-04-15 PROCEDURE — 90847 FAMILY PSYTX W/PT 50 MIN: CPT | Performed by: MARRIAGE & FAMILY THERAPIST
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
## 2022-04-29 ENCOUNTER — APPOINTMENT (OUTPATIENT)
Dept: BEHAVIORAL HEALTH | Facility: CLINIC | Age: 32
End: 2022-04-29
Payer: COMMERCIAL

## 2022-05-04 RX ORDER — LOSARTAN POTASSIUM AND HYDROCHLOROTHIAZIDE 12.5; 5 MG/1; MG/1
TABLET ORAL
Qty: 60 TABLET | Refills: 2 | Status: SHIPPED | OUTPATIENT
Start: 2022-05-04 | End: 2022-06-24 | Stop reason: SDUPTHER

## 2022-05-04 NOTE — TELEPHONE ENCOUNTER
Received request via: Pharmacy    Was the patient seen in the last year in this department? Yes  3/30/2022  Does the patient have an active prescription (recently filled or refills available) for medication(s) requested? No

## 2022-05-20 ENCOUNTER — OFFICE VISIT (OUTPATIENT)
Dept: BEHAVIORAL HEALTH | Facility: CLINIC | Age: 32
End: 2022-05-20
Payer: COMMERCIAL

## 2022-05-20 DIAGNOSIS — F33.41 RECURRENT MAJOR DEPRESSIVE DISORDER, IN PARTIAL REMISSION (HCC): ICD-10-CM

## 2022-05-20 PROCEDURE — 90847 FAMILY PSYTX W/PT 50 MIN: CPT | Performed by: MARRIAGE & FAMILY THERAPIST

## 2022-05-23 NOTE — PROGRESS NOTES
[facility]   Therapy Progress Note        Name: You Sumner  MRN: [id number]  : 1990  Age: 31 y.o.  Date of assessment: 2022  PCP: Mitzy Wiggins P.A.-C.  Persons in attendance: Patient and Spouse/Partner  Total session time: 55 minutes      Topics addressed in psychotherapy include: Met with the patient and his wife in the office for a couple’s counseling session.  The patient arrived late due to traffic after work.  The session was carried over the normal and of business day time.  The patient and his wife both discussed issues in their relationship with the do not feel heard.  He was noted that the patient has made significant improvements however when he is emotionally challenged, it appears that he disengages from a conversation.  Worked with the patient and his wife, lowering frustration levels in their conversations.    Discussed with the patient and his wife positive outcomes in communications.     The couple appears to make good progress, however became distant when emotionally challenged.     Work with the couple on My Decisions, Your Decisions, Our Decisions.    Objective Observations:   Participation:Active verbal participation, Limited verbal participation, Guarded and Defensive   Grooming:Casual   Cognition:Alert and Fully Oriented   Eye Contact:Limited   Mood:Anxious and Irritable   Affect:Flexible, Full range and Congruent with content   Thought Process:Logical and Goal-directed   Speech:Rate within normal limits and Volume within normal limits    Current Risk:   Suicide: low   Homicide: low   Self-Harm: low   Relapse: low   Safety Plan Reviewed: not applicable    Care Plan Updated: No    Does patient express agreement with the above plan? Yes     Diagnosis:  1. Recurrent major depressive disorder, in partial remission (HCC)        Referral appointment(s) scheduled? No       GOLDEN Dickson

## 2022-06-14 ENCOUNTER — OFFICE VISIT (OUTPATIENT)
Dept: SLEEP MEDICINE | Facility: MEDICAL CENTER | Age: 32
End: 2022-06-14
Payer: COMMERCIAL

## 2022-06-14 VITALS
BODY MASS INDEX: 38.36 KG/M2 | HEART RATE: 76 BPM | SYSTOLIC BLOOD PRESSURE: 124 MMHG | DIASTOLIC BLOOD PRESSURE: 84 MMHG | OXYGEN SATURATION: 96 % | HEIGHT: 76 IN | RESPIRATION RATE: 16 BRPM | WEIGHT: 315 LBS

## 2022-06-14 DIAGNOSIS — G47.33 OSA (OBSTRUCTIVE SLEEP APNEA): ICD-10-CM

## 2022-06-14 PROCEDURE — 99204 OFFICE O/P NEW MOD 45 MIN: CPT | Performed by: FAMILY MEDICINE

## 2022-06-14 ASSESSMENT — PATIENT HEALTH QUESTIONNAIRE - PHQ9
SUM OF ALL RESPONSES TO PHQ QUESTIONS 1-9: 14
5. POOR APPETITE OR OVEREATING: 3 - NEARLY EVERY DAY
CLINICAL INTERPRETATION OF PHQ2 SCORE: 1

## 2022-06-14 ASSESSMENT — FIBROSIS 4 INDEX: FIB4 SCORE: 0.56

## 2022-06-14 NOTE — PROGRESS NOTES
"     [facility]  Consult Note     Date: 6/14/2022 / Time: 8:50 AM    Patient ID:   Name:             You Sumner   YOB: 1990  Age:                 31 y.o.  male   MRN:               [id number]      Thank you for requesting a sleep medicine consultation on You Sumner at the sleep center. He presents today with the chief complaints of snoring, breathing pauses and excessive daytime sleepiness. He is referred by Mitzy Wiggins for evaluation and treatment of sleep disorder breathing.     HISTORY OF PRESENT ILLNESS:       At night,  You Sumner goes to bed around 10 pm on weekdays and around 10-11 pm on the weekends. He gets out of bed at 4-5 am on weekdays and at 6 am on the weekends.  He averages about 4-5 hrs of sleep on a good night and 2-3 hrs on a bad night. He falls asleep within 15 minutes. He wakes up about 1 times during the night due to bathroom use or no obvious reason and on average It takes him 30 min to fall back asleep. He is not aware of snoring and breathing pauses in sleep.  He  denies any symptoms of restless legs syndrome such as an \"urge to move\"  He  legs in the evening or bedtime. He  denies any symptoms of narcolepsy such as sleep paralysis or cataplexy, or any symptoms to suggest parasomnias such as sleep walking or acting out of dreams. He  has not used any medications for the sleep problem.    Overall, he doesnot finds his sleep refreshing. In terms of  excessive daytime sleepiness,  He complains of sleepiness while  at work, while reading or watching TV and occasionally while driving. [place] sleepiness scale score is 9/24.He rarely takes a nap. The naps are usually 1-2 hrs long.He drinks about 0-1 enerygy drink per day.      REVIEW OF SYSTEMS:       Constitutional: Denies fevers, Denies weight changes  Eyes: Denies changes in vision, no eye pain  Ears/Nose/Throat/Mouth: Denies nasal congestion or sore throat "   Cardiovascular: Denies chest pain or palpitations   Respiratory: Denies shortness of breath , Denies cough  Gastrointestinal/Hepatic: Denies abdominal pain, nausea, vomiting, diarrhea, constipation or GI bleeding   Genitourinary: Denies bladder dysfunction, dysuria or frequency  Musculoskeletal/Rheum: Denies  joint pain and swelling   Skin/Breast: Denies rash  Neurological: Denies headache, confusion, memory loss or focal weakness/parasthesias  Psychiatric: denies mood disorder     Comprehensive review of systems form is reviewed with the patient and is attached in the EMR.     PMH:  has a past medical history of Chickenpox, Head ache, and Seen by marriage guidance counselor (02/24/2022).  MEDS:   Current Outpatient Medications:   •  losartan-hydrochlorothiazide (HYZAAR) 50-12.5 MG per tablet, TAKE TWO TABLETS BY MOUTH ONCE DAILY, Disp: 60 Tablet, Rfl: 2  •  albuterol 108 (90 Base) MCG/ACT Aero Soln inhalation aerosol, Inhale 2 Puffs every four hours as needed for Shortness of Breath., Disp: 1 Each, Rfl: 0  •  escitalopram (LEXAPRO) 10 MG Tab, Take 1 Tablet by mouth 1/2 hour after dinner., Disp: 90 Tablet, Rfl: 1  •  Aspirin-Acetaminophen-Caffeine (EXCEDRIN PO), Take  by mouth., Disp: , Rfl:   •  losartan-hydrochlorothiazide (HYZAAR) 100-25 MG per tablet, TAKE ONE TABLET BY MOUTH ONCE A DAY (Patient not taking: Reported on 6/14/2022), Disp: 30 Tablet, Rfl: 2  ALLERGIES: No Known Allergies  SURGHX:   Past Surgical History:   Procedure Laterality Date   • APPENDECTOMY       SOCHX:  reports that he has been smoking cigarettes and pipe. He has never used smokeless tobacco. He reports current alcohol use. He reports that he does not use drugs.   FH:   Family History   Problem Relation Age of Onset   • Sleep Apnea Mother    • Sleep Apnea Father    • Hyperlipidemia Father    • Hypertension Father    • Heart Disease Father    • No Known Problems Sister    • No Known Problems Sister    • No Known Problems Brother   "      Physical Exam:  Vitals/ General Appearance:   Weight/BMI: Body mass index is 38.95 kg/m².  /84 (BP Location: Left arm, Patient Position: Sitting, BP Cuff Size: Adult)   Pulse 76   Resp 16   Ht 1.93 m (6' 4\")   Wt (!) 145 kg (320 lb)   SpO2 96%   Vitals:    06/14/22 0842   BP: 124/84   BP Location: Left arm   Patient Position: Sitting   BP Cuff Size: Adult   Pulse: 76   Resp: 16   SpO2: 96%   Weight: (!) 145 kg (320 lb)   Height: 1.93 m (6' 4\")           Constitutional: Alert, no distress, well-groomed.  Skin: No rashes in visible areas.  Eye: Round. Conjunctiva clear, lids normal. No icterus.   ENMT: Lips pink without lesions, good dentition, moist mucous membranes. Phonation normal.  Neck: No masses, no thyromegaly. Moves freely without pain.  CV: Pulse as reported by patient  Respiratory: Unlabored respiratory effort, no cough or audible wheeze  Psych: Alert and oriented x3, normal affect and mood.   INVESTIGATIONS:       ASSESSMENT AND PLAN     1. He  has symptoms of Obstructive Sleep Apnea (DEBORAH). He  has excessive daytime sleepiness that interferes with activites of daily living. He  risk factors for DEBORAH include obesity, thick neck and crowded oropharynx.     The pathophysiology of DEBORAH and the increased risk of cardiovascular morbidity from untreated DEBORAH is discussed in detail with the patient. He  also has HTN which can be worsened by her DEBORAH.     We have discussed diagnostic options including in-laboratory, attended polysomnography and home sleep testing. We have also discussed treatment options including airway pressurization, reconstructive otolaryngologic surgery, dental appliances and weight management.       Subsequently,treatment options will be discussed based on the diagnostic study. Meanwhile, He is urged to avoid supine sleep, weight gain and alcoholic beverages since all of these can worsen DEBORAH. He is cautioned against drowsy driving. If He feels sleepy while driving, He must pull " over for a break/nap, rather than persist on the road, in the interest of He own safety and that of others on the road.    Plan  -  He  will be scheduled for an overnight HST to assess sleep related breathing disorder.    2. Regarding treatment of other past medical problems and general health maintenance,  He is urged to follow up with PCP.

## 2022-06-21 ENCOUNTER — OFFICE VISIT (OUTPATIENT)
Dept: BEHAVIORAL HEALTH | Facility: CLINIC | Age: 32
End: 2022-06-21
Payer: COMMERCIAL

## 2022-06-21 DIAGNOSIS — F33.41 RECURRENT MAJOR DEPRESSIVE DISORDER, IN PARTIAL REMISSION (HCC): ICD-10-CM

## 2022-06-21 PROCEDURE — 90847 FAMILY PSYTX W/PT 50 MIN: CPT | Performed by: MARRIAGE & FAMILY THERAPIST

## 2022-06-21 NOTE — PROGRESS NOTES
[facility]   Therapy Progress Note        Name: You Sumner  MRN: [id number]  : 1990  Age: 31 y.o.  Date of assessment: 2022  PCP: Mitzy Wiggins P.A.-C.  Persons in attendance: Patient  Total session time: 55 minutes      Topics addressed in psychotherapy include: Met with the patient and his wife in office for a couple’s intake session.  The couple appears to be making good progress as they were able to discuss issues which have concern them with the assistance of this clinician.  Assisted the couple in strengthening communication skills.  The session ended on a very positive note.      This dictation has been created using voice recognition software and/or scribes. The accuracy of the dictation is limited by the abilities of the software and the expertise of the scribes. I expect there may be some errors of grammar and possibly content. I made every attempt to manually correct the errors within my dictation. However, errors related to voice recognition software and/or scribes may still exist and should be interpreted within the appropriate context.    Objective Observations:   Participation:Active verbal participation, Attentive and Engaged   Grooming:Casual   Cognition:Alert and Fully Oriented   Eye Contact:Good   Mood:Euthymic   Affect:Flexible and Full range   Thought Process:Logical and Goal-directed   Speech:Rate within normal limits and Volume within normal limits    Current Risk:   Suicide: low   Homicide: low   Self-Harm: low   Relapse: low   Safety Plan Reviewed: not applicable    Care Plan Updated: No    Does patient express agreement with the above plan? Yes     Diagnosis:  1. Recurrent major depressive disorder, in partial remission (HCC)        Referral appointment(s) scheduled? No       GOLDEN Dickson

## 2022-06-24 RX ORDER — LOSARTAN POTASSIUM AND HYDROCHLOROTHIAZIDE 12.5; 5 MG/1; MG/1
TABLET ORAL
Qty: 60 TABLET | Refills: 2 | Status: SHIPPED | OUTPATIENT
Start: 2022-06-24 | End: 2022-08-24

## 2022-06-24 NOTE — TELEPHONE ENCOUNTER
Request to another [facility]    Received request via: Patient    Was the patient seen in the last year in this department? Yes  3/30/2022  Does the patient have an active prescription (recently filled or refills available) for medication(s) requested? No

## 2022-07-19 DIAGNOSIS — F33.41 RECURRENT MAJOR DEPRESSIVE DISORDER, IN PARTIAL REMISSION (HCC): ICD-10-CM

## 2022-07-19 RX ORDER — ESCITALOPRAM OXALATE 10 MG/1
10 TABLET ORAL
Qty: 90 TABLET | Refills: 1 | Status: SHIPPED
Start: 2022-07-19 | End: 2022-08-10

## 2022-07-28 ENCOUNTER — HOME STUDY (OUTPATIENT)
Dept: SLEEP MEDICINE | Facility: MEDICAL CENTER | Age: 32
End: 2022-07-28
Payer: COMMERCIAL

## 2022-07-28 DIAGNOSIS — G47.33 OSA (OBSTRUCTIVE SLEEP APNEA): ICD-10-CM

## 2022-07-28 PROCEDURE — 95806 SLEEP STUDY UNATT&RESP EFFT: CPT | Performed by: STUDENT IN AN ORGANIZED HEALTH CARE EDUCATION/TRAINING PROGRAM

## 2022-08-04 ENCOUNTER — OFFICE VISIT (OUTPATIENT)
Dept: BEHAVIORAL HEALTH | Facility: CLINIC | Age: 32
End: 2022-08-04
Payer: COMMERCIAL

## 2022-08-04 DIAGNOSIS — F33.41 RECURRENT MAJOR DEPRESSIVE DISORDER, IN PARTIAL REMISSION (HCC): ICD-10-CM

## 2022-08-04 PROCEDURE — 90847 FAMILY PSYTX W/PT 50 MIN: CPT | Performed by: MARRIAGE & FAMILY THERAPIST

## 2022-08-05 NOTE — PROGRESS NOTES
[facility]   Therapy Progress Note        Name: You Sumner  MRN: [id number]  : 1990  Age: 31 y.o.  Date of assessment: 2022  PCP: Mitzy Wiggins P.A.-C.  Persons in attendance: Patient and Spouse/Partner  Total session time: 58 minutes      Topics addressed in psychotherapy include: Met with the patient and his wife for a couple's counseling session.  The patient reports being in the middle of a depressive episode and his wife who suffers from bipolar disorder is also in a depressive episode.  The patient discussed how he needs distance and to be left alone when he is in a depressive episode while his wife explained that she needs closeness during a depressive phase of her bipolar disorder.  Worked with a couple to understand each other's needs and find the middle ground where each could get their needs met.  Assisted the couple in increasing dialog.  Couple appear to be communicating much better at the end of session.      This dictation has been created using voice recognition software and/or scribes. The accuracy of the dictation is limited by the abilities of the software and the expertise of the scribes. I expect there may be some errors of grammar and possibly content. I made every attempt to manually correct the errors within my dictation. However, errors related to voice recognition software and/or scribes may still exist and should be interpreted within the appropriate context.    Objective Observations:   Participation:Limited verbal participation and Defensive   Grooming:Casual   Cognition:Fully Oriented   Eye Contact:Limited   Mood:Depressed   Affect:Full range, Congruent with content, and Sad   Thought Process:Logical and Goal-directed   Speech:Rate within normal limits, Soft, and Hypotalkative    Current Risk:   Suicide: low   Homicide: low   Self-Harm: low   Relapse: low   Safety Plan Reviewed: no    Care Plan Updated: No    Does patient express agreement  with the above plan? Yes     Diagnosis:  1. Recurrent major depressive disorder, in partial remission (HCC)        Referral appointment(s) scheduled? No       BARRINGTON Dickson.

## 2022-08-10 ENCOUNTER — TELEMEDICINE (OUTPATIENT)
Dept: BEHAVIORAL HEALTH | Facility: CLINIC | Age: 32
End: 2022-08-10
Payer: COMMERCIAL

## 2022-08-10 DIAGNOSIS — F33.41 RECURRENT MAJOR DEPRESSIVE DISORDER, IN PARTIAL REMISSION (HCC): ICD-10-CM

## 2022-08-10 DIAGNOSIS — F41.1 GAD (GENERALIZED ANXIETY DISORDER): ICD-10-CM

## 2022-08-10 PROCEDURE — 99215 OFFICE O/P EST HI 40 MIN: CPT | Mod: GT | Performed by: PSYCHIATRY & NEUROLOGY

## 2022-08-10 RX ORDER — FLUOXETINE HYDROCHLORIDE 20 MG/1
20 CAPSULE ORAL EVERY MORNING
Qty: 30 CAPSULE | Refills: 1 | Status: SHIPPED | OUTPATIENT
Start: 2022-08-10 | End: 2022-09-23

## 2022-08-10 ASSESSMENT — ANXIETY QUESTIONNAIRES
3. WORRYING TOO MUCH ABOUT DIFFERENT THINGS: SEVERAL DAYS
1. FEELING NERVOUS, ANXIOUS, OR ON EDGE: MORE THAN HALF THE DAYS
6. BECOMING EASILY ANNOYED OR IRRITABLE: NEARLY EVERY DAY
5. BEING SO RESTLESS THAT IT IS HARD TO SIT STILL: NOT AT ALL
7. FEELING AFRAID AS IF SOMETHING AWFUL MIGHT HAPPEN: SEVERAL DAYS
GAD7 TOTAL SCORE: 13
2. NOT BEING ABLE TO STOP OR CONTROL WORRYING: NEARLY EVERY DAY
4. TROUBLE RELAXING: NEARLY EVERY DAY

## 2022-08-10 ASSESSMENT — PATIENT HEALTH QUESTIONNAIRE - PHQ9
CLINICAL INTERPRETATION OF PHQ2 SCORE: 2
5. POOR APPETITE OR OVEREATING: 2 - MORE THAN HALF THE DAYS
SUM OF ALL RESPONSES TO PHQ QUESTIONS 1-9: 9
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
## 2022-08-11 NOTE — PROCEDURES
DIAGNOSTIC HOME SLEEP TEST (HST) REPORT       PATIENT ID:  NAME:  You Sumner  MRN:               [id number]  YOB: 1990  DATE OF STUDY: 07/28/22      Impression:     This study shows evidence of:     1. Severe obstructive sleep apnea with  Respiratory Event Index (MARC) of 33.4 per hour. These findings are based on the recording time (flow evaluation time). It is not possible with this device to determine a traditional apnea+hypopnea index (AHI) for total sleep time since EEG channels are not available.     2. Oxygenation O2 Sat. jyoti was 68% and mean O2 sat was 91% and baseline O2 at 92 %. O2 sat was below 88% for 40 min of the flow evaluation time. Oxygen Desaturation (>=3%) Index was elevated at 32.2/hr. AVG HR was 65 BPM.      TECHNICAL DESCRIPTION: Centice apnea link air device used was a type-III home study device. Home sleep study recording included: Airflow recording by nasal pressure transducer; Respiratory Effort by 1 RIP Band; Oxygen Saturation and heart rate by finger oximetry. A position sensor and a snore channel was also used.    Scoring Criteria: A modification of the the AASM Manual for the Scoring of Sleep and Associated Events, 2012, was used.   Obstructive apnea was scored by cessation of airflow for at least 10 seconds with continuing respiratory effort.  Central apnea was scored by cessation of airflow for at least 10 seconds with no effort.  Hypopnea was scored by a 30% or more reduction in airflow for at least 10 seconds accompanied by an arterial oxygen desaturation of 3% or more.  (For Medicare patients, hypopneas were scored by a 30% or more reduction in airflow for at least 10 seconds accompanied by an arterial oxygen saturation of 4% or more, as required by their insurance, CMS.        General sleep summary: . Total recording time is 6 hours and 15 minutes and total flow evaluation time is 5 hours and 40 minutes. The patient spent 5 hours and 12 minutes  in the supine position and 0 hours and 28 minutes in the nonsupine position.    Respiratory events:    Apneas: Total 19 (Obstructive apnea index 1.6/hr, Central apnea index 1.8 /hr, mixed 0 /hour)  Hypopneas: Total 171 with a Hypopnea index of 30.1 /hr    Recommendations:    1. CPAP titration study vs Auto CPAP trial .   2.   In general patients with sleep apnea are advised to avoid alcohol and sedatives and to not operate a motor vehicle while drowsy. In some cases alternative treatment options may prove effective in resolving sleep apnea in these options include upper airway surgery, the use of a dental orthotic or weight loss and positional therapy. Clinical correlation is required.         Hugo Roberts MD

## 2022-08-12 ENCOUNTER — OFFICE VISIT (OUTPATIENT)
Dept: SLEEP MEDICINE | Facility: MEDICAL CENTER | Age: 32
End: 2022-08-12
Payer: COMMERCIAL

## 2022-08-12 VITALS
HEART RATE: 85 BPM | OXYGEN SATURATION: 95 % | SYSTOLIC BLOOD PRESSURE: 130 MMHG | WEIGHT: 315 LBS | RESPIRATION RATE: 16 BRPM | DIASTOLIC BLOOD PRESSURE: 80 MMHG | HEIGHT: 76 IN | BODY MASS INDEX: 38.36 KG/M2

## 2022-08-12 DIAGNOSIS — G47.33 OSA (OBSTRUCTIVE SLEEP APNEA): Primary | ICD-10-CM

## 2022-08-12 PROCEDURE — 99213 OFFICE O/P EST LOW 20 MIN: CPT | Performed by: STUDENT IN AN ORGANIZED HEALTH CARE EDUCATION/TRAINING PROGRAM

## 2022-08-12 ASSESSMENT — FIBROSIS 4 INDEX: FIB4 SCORE: 0.56

## 2022-08-12 NOTE — PROGRESS NOTES
[facility] Follow-up Visit    CC: Follow-up to discuss sleep study results      HPI:  You Sumner is a 31 y.o.male  with hypertension, depression, morbid obesity, and severe obstructive sleep apnea.  Presents today to discuss sleep study results.    He reports neither sleep study went well.  He was surprised he was able to sleep as long as he did on his back.  He did have some side sleeping towards the end of the study.  States at home he generally sleeps on his side.  He continues to snore in his sleep.  He continues to have daytime sleepiness and unrefreshing sleep.    Patient Active Problem List    Diagnosis Date Noted    Cough 03/30/2022    Seen by marriage guidance counselor 02/24/2022    Recurrent major depressive disorder, in partial remission (HCC) 12/23/2021    Benign essential HTN 05/27/2021    Chronic tension-type headache, not intractable 05/13/2021       Past Medical History:   Diagnosis Date    Chickenpox     Head ache     Seen by marriage guidance counselor 02/24/2022        Past Surgical History:   Procedure Laterality Date    APPENDECTOMY         Family History   Problem Relation Age of Onset    Sleep Apnea Mother     Sleep Apnea Father     Hyperlipidemia Father     Hypertension Father     Heart Disease Father     No Known Problems Sister     No Known Problems Sister     No Known Problems Brother        Social History     Socioeconomic History    Marital status:      Spouse name: Not on file    Number of children: Not on file    Years of education: Not on file    Highest education level: Not on file   Occupational History    Not on file   Tobacco Use    Smoking status: Some Days     Types: Cigarettes, Pipe    Smokeless tobacco: Never   Vaping Use    Vaping Use: Never used   Substance and Sexual Activity    Alcohol use: Yes     Comment: a beer a week    Drug use: No    Sexual activity: Not on file   Other Topics Concern    Not on file   Social History Narrative     "    Works for [employer]      Social Determinants of Health     Financial Resource Strain: Not on file   Food Insecurity: Not on file   Transportation Needs: Not on file   Physical Activity: Not on file   Stress: Not on file   Social Connections: Not on file   Intimate Partner Violence: Not on file   Housing Stability: Not on file       Current Outpatient Medications   Medication Sig Dispense Refill    FLUoxetine (PROZAC) 20 MG Cap Take 1 Capsule by mouth every morning. 30 Capsule 1    losartan-hydrochlorothiazide (HYZAAR) 50-12.5 MG per tablet TAKE TWO TABLETS BY MOUTH ONCE DAILY (Patient not taking: Reported on 8/12/2022) 60 Tablet 2    albuterol 108 (90 Base) MCG/ACT Aero Soln inhalation aerosol Inhale 2 Puffs every four hours as needed for Shortness of Breath. 1 Each 0    Aspirin-Acetaminophen-Caffeine (EXCEDRIN PO) Take  by mouth.      losartan-hydrochlorothiazide (HYZAAR) 100-25 MG per tablet TAKE ONE TABLET BY MOUTH ONCE A DAY (Patient not taking: No sig reported) 30 Tablet 2     No current facility-administered medications for this visit.        ALLERGIES: Patient has no known allergies.    ROS  Constitutional: Denies fevers, Denies weight changes  Ears/Nose/Throat/Mouth: Denies nasal congestion or sore throat   Cardiovascular: Denies chest pain  Respiratory: Denies shortness of breath, Denies cough  Gastrointestinal/Hepatic: Denies nausea, vomiting  Sleep: see HPI      PHYSICAL EXAM  /80 (BP Location: Left arm, Patient Position: Sitting, BP Cuff Size: Large adult)   Pulse 85   Resp 16   Ht 1.93 m (6' 4\")   Wt (!) 152 kg (335 lb)   SpO2 95%   BMI 40.78 kg/m²   Appearance: Well-nourished, well-developed, no acute distress  Eyes:  No scleral icterus , EOMI  ENMT: masked  Musculoskeletal:  Grossly normal; gait and station normal; digits and nails normal  Skin:  No rashes, petechiae, cyanosis  Neurologic: without focal signs; oriented to person, time, place, and purpose; judgement " intact      Medical Decision Making   Assessment and Plan    You Sumner is a 31 y.o.male  with hypertension, depression, morbid obesity, and severe obstructive sleep apnea.  Presents today to discuss sleep study results.    Obstructive sleep apnea   Reviewed recent HST with patient showing an AHI of 33.4 and Min Oxygen saturation of 60%.  Time at or below 88% saturation of 40 minutes  Based on sleep study and symptoms meets criteria for severe obstructive sleep apnea.   We discussed the pathophysiology of obstructive sleep apnea (DEBORAH) and risk factors for the disease. We also discussed possible consequences of untreated DEBORAH, including excessive daytime sleepiness and fatigue, cognitive dysfunction, cardiovascular complications such as elevated blood pressure, heart attacks, cardiac arrhythmias, and strokes. We discussed how DEBORAH typically gets worse with age. We discussed treatment options for DEBORAH, including the gold standard therapy (PAP), alternative options such as a mandibular advancement device (custom-made oral appliances) and surgeries. We will proceed CPAP therapy.     RECOMMENDATIONS  -Start Auto CPAP at pressures 5-15 cm H2O  -Discussed importance of adherence/compliance   -Prescription generated for supplies   -Patient counseled to avoid driving when sleepy. Encouraged to anticipate sleepiness, consider taking a 10 min nap prior to driving, alternate with another , or pull over if sleepy while driving  -Advised to contact our office or myself with any questions via My[facility]    Positive airway pressure will favorably impact many of the adverse conditions and effects provoked by DEBORAH.    Have advised the patient to follow up with the appropriate healthcare practitioners for all other medical problems and issues.    Return for 1-2 months after starting PAP therapy.      Please note portions of this record was created using voice recognition software. I have made every reasonable attempt  to correct obvious errors, but I expect that there are errors of grammar and possibly content I did not discover before finalizing the note.

## 2022-08-24 RX ORDER — LOSARTAN POTASSIUM AND HYDROCHLOROTHIAZIDE 12.5; 5 MG/1; MG/1
TABLET ORAL
Qty: 180 TABLET | Refills: 1 | Status: SHIPPED | OUTPATIENT
Start: 2022-08-24 | End: 2022-09-27

## 2022-08-25 ENCOUNTER — OFFICE VISIT (OUTPATIENT)
Dept: BEHAVIORAL HEALTH | Facility: CLINIC | Age: 32
End: 2022-08-25
Payer: COMMERCIAL

## 2022-08-25 DIAGNOSIS — F33.41 RECURRENT MAJOR DEPRESSIVE DISORDER, IN PARTIAL REMISSION (HCC): ICD-10-CM

## 2022-08-25 PROCEDURE — 90837 PSYTX W PT 60 MINUTES: CPT | Performed by: MARRIAGE & FAMILY THERAPIST

## 2022-08-26 NOTE — PROGRESS NOTES
[facility]   Therapy Progress Note        Name: You Sumner  MRN: [id number]  : 1990  Age: 31 y.o.  Date of assessment: 2022  PCP: Mitzy Wiggins P.A.-C.  Persons in attendance: Patient  Total session time: 55 minutes      Topics addressed in psychotherapy include: Met with the patient in office for an individual counseling session.  This was the first individual session without the patients wife.  The patient displayed his significantly better mood, was conversant, and appeared greatly relaxed.  Worked with the patient on understanding his level of depression and anxiety separate from the symptoms which are expanded within his relationship.  Assisted the patient in working on managing his own feelings rather than is relational feelings.    This dictation has been created using voice recognition software and/or scribes. The accuracy of the dictation is limited by the abilities of the software and the expertise of the scribes. I expect there may be some errors of grammar and possibly content. I made every attempt to manually correct the errors within my dictation. However, errors related to voice recognition software and/or scribes may still exist and should be interpreted within the appropriate context.    Objective Observations:   Participation:Active verbal participation, Attentive, and Engaged   Grooming:Casual   Cognition:Alert and Fully Oriented   Eye Contact:Good   Mood:Euthymic and Happy   Affect:Flexible, Full range, and Congruent with content   Thought Process:Logical and Goal-directed   Speech:Rate within normal limits and Volume within normal limits    Current Risk:   Suicide: low   Homicide: low   Self-Harm: low   Relapse: low   Safety Plan Reviewed: not applicable    Care Plan Updated: No    Does patient express agreement with the above plan? Yes     Diagnosis:  1. Recurrent major depressive disorder, in partial remission (HCC)        Referral  appointment(s) scheduled? No       BARRINGTON Dickson.

## 2022-09-23 ENCOUNTER — TELEMEDICINE (OUTPATIENT)
Dept: BEHAVIORAL HEALTH | Facility: CLINIC | Age: 32
End: 2022-09-23
Payer: COMMERCIAL

## 2022-09-23 DIAGNOSIS — F33.0 MILD EPISODE OF RECURRENT MAJOR DEPRESSIVE DISORDER (HCC): ICD-10-CM

## 2022-09-23 DIAGNOSIS — F41.1 GAD (GENERALIZED ANXIETY DISORDER): ICD-10-CM

## 2022-09-23 PROBLEM — F33.41 RECURRENT MAJOR DEPRESSIVE DISORDER, IN PARTIAL REMISSION (HCC): Status: RESOLVED | Noted: 2021-12-23 | Resolved: 2022-09-23

## 2022-09-23 PROCEDURE — 96127 BRIEF EMOTIONAL/BEHAV ASSMT: CPT | Mod: 95 | Performed by: PSYCHIATRY & NEUROLOGY

## 2022-09-23 PROCEDURE — 99214 OFFICE O/P EST MOD 30 MIN: CPT | Mod: 95 | Performed by: PSYCHIATRY & NEUROLOGY

## 2022-09-23 RX ORDER — FLUOXETINE HYDROCHLORIDE 40 MG/1
40 CAPSULE ORAL DAILY
Qty: 30 CAPSULE | Refills: 1 | Status: SHIPPED | OUTPATIENT
Start: 2022-09-23 | End: 2022-11-18

## 2022-09-23 ASSESSMENT — ANXIETY QUESTIONNAIRES
7. FEELING AFRAID AS IF SOMETHING AWFUL MIGHT HAPPEN: NOT AT ALL
2. NOT BEING ABLE TO STOP OR CONTROL WORRYING: MORE THAN HALF THE DAYS
4. TROUBLE RELAXING: MORE THAN HALF THE DAYS
3. WORRYING TOO MUCH ABOUT DIFFERENT THINGS: SEVERAL DAYS
GAD7 TOTAL SCORE: 10
1. FEELING NERVOUS, ANXIOUS, OR ON EDGE: MORE THAN HALF THE DAYS
6. BECOMING EASILY ANNOYED OR IRRITABLE: NEARLY EVERY DAY
5. BEING SO RESTLESS THAT IT IS HARD TO SIT STILL: NOT AT ALL

## 2022-09-23 ASSESSMENT — PATIENT HEALTH QUESTIONNAIRE - PHQ9
5. POOR APPETITE OR OVEREATING: 1 - SEVERAL DAYS
CLINICAL INTERPRETATION OF PHQ2 SCORE: 2
SUM OF ALL RESPONSES TO PHQ QUESTIONS 1-9: 9

## 2022-09-23 NOTE — PROGRESS NOTES
This evaluation was conducted via Zoom using secure and encrypted videoconferencing technology. The patient was in their home in the [facility].    The patient's identity was confirmed and verbal consent was obtained for this virtual visit.      PSYCHIATRY FOLLOW-UP NOTE      Name: You Sumner  MRN: [id number]  : 1990  Age: 31 y.o.  Date of assessment: 2022  PCP: Mitzy Wiggins P.A.-C.  Persons in attendance: Patient      REASON FOR VISIT/CHIEF COMPLAINT (as stated by Patient):  You Sumner is a 31 y.o., White male, attending follow-up appointment for mood and anxiety management.      HISTORY OF PRESENT ILLNESS:  You Sumner is a 31 y.o. old male with MDD comes in today for follow up. Patient was last seen 6 weeks ago, and following treatment planning recommendations were done:  Taper Lexapro to 5 mg daily X 1 week and stop.   Add Prozac 20 mg daily for mood and anxiety management.    Continue psychotherapy for mood and anxiety management.      Patient was compliant with Prozac and for first 3 to 4 weeks he noticed marked improvement in mood and anxiety but over the last 2 weeks he has started feeling anxiety with irritability.  Discussed the importance of  Prozac as a cause hoarseness underlying anxiety due to increasing work demands.  Patient scored 9 on PHQ-9 and 10 on AVERY-7.  Agreed with increasing Prozac to 40 mg daily dose to assess if mood and anxiety improves with the approach.    Patient agreed with contacting me on MyChart if worsening of anxiety or irritability is seen with 40 mg and in that case we will send a new prescription for duloxetine to the pharmacy.      CURRENT MEDICATIONS:  Current Outpatient Medications   Medication Sig Dispense Refill    losartan-hydrochlorothiazide (HYZAAR) 50-12.5 MG per tablet TAKE TWO TABLETS BY MOUTH DAILY 180 Tablet 1    FLUoxetine (PROZAC) 20 MG Cap Take 1 Capsule by mouth every morning. 30  Capsule 1    albuterol 108 (90 Base) MCG/ACT Aero Soln inhalation aerosol Inhale 2 Puffs every four hours as needed for Shortness of Breath. 1 Each 0    losartan-hydrochlorothiazide (HYZAAR) 100-25 MG per tablet TAKE ONE TABLET BY MOUTH ONCE A DAY (Patient not taking: No sig reported) 30 Tablet 2    Aspirin-Acetaminophen-Caffeine (EXCEDRIN PO) Take  by mouth.       No current facility-administered medications for this visit.       MEDICAL HISTORY  Past Medical History:   Diagnosis Date    Chickenpox     Head ache     Seen by marriage guidance counselor 02/24/2022     Past Surgical History:   Procedure Laterality Date    APPENDECTOMY         PAST PSYCHIATRIC HISTORY  Prior psychiatric hospitalization: no  Prior Self harm/suicide attempt: no  Prior Diagnosis: Depression, ADHD     PAST PSYCHIATRIC MEDICATIONS  Ritalin      FAMILY HISTORY  Psychiatric diagnosis:  no  History of suicide attempts:  no  Substance abuse history:  no     SUBSTANCE USE HISTORY:  History of excessive alcohol abuse with blackout in his 20s  Reports occasional alcohol use now and denies any drug abuse     SOCIAL HISTORY  Born in [place]  Currently in [facility]  Working for [employer]   with no kids  Describes poor relationship with father      REVIEW OF SYSTEMS:        Constitutional negative   Eyes negative   Ears/Nose/Mouth/Throat negative   Cardiovascular negative   Respiratory negative   Gastrointestinal negative   Genitourinary negative   Muscular negative   Integumentary negative   Neurological negative   Endocrine negative   Hematologic/Lymphatic negative     PHYSICAL EXAMINAION:  Vital signs: There were no vitals taken for this visit.  Musculoskeletal: Normal gait.   Abnormal movements: none      MENTAL STATUS EXAMINATION      General:   - Grooming and hygiene: Casual,   - Apparent distress: none,   - Behavior: Calm  - Eye Contact:  Good,   - no psychomotor agitation or retardation    - Participation: Active verbal  participation  Orientation: Alert and Fully Oriented to person, place and time  Mood: Anxious  Affect: Full range,  Thought Process: Logical and Goal-directed  Thought Content: Denies suicidal or homicidal ideations, intent or plan Within normal limits  Perception: Denies auditory or visual hallucinations. No delusions noted   Attention span and concentration: Intact   Speech:Rate within normal limits and Volume within normal limits  Language: Appropriate   Insight: Good  Judgment: Good  Recent and remote memory: No gross evidence of memory deficits        DEPRESSION SCREENING:  Depression Screen (PHQ-2/PHQ-9) 1/27/2022 6/14/2022 8/10/2022   PHQ-2 Total Score 1 1 2   PHQ-9 Total Score 5 14 9       Interpretation of PHQ-9 Total Score   Score Severity   1-4 No Depression   5-9 Mild Depression   10-14 Moderate Depression   15-19 Moderately Severe Depression   20-27 Severe Depression    CURRENT RISK:       Suicidal: Low       Homicidal: Low       Self-Harm: Low       Relapse: Low       Crisis Safety Plan Reviewed Not Indicated       If evidence of imminent risk is present, intervention/plan:      MEDICAL RECORDS/LABS/DIAGNOSTIC TESTS REVIEWED:  No new lab since last visit     [place]  records -   Reviewed     PLAN:  (1) MDD  PHQ9: 9 (compared to 9 in past); GAD7: 10 (compared to 13 in past)  Increase Prozac to 40 mg daily for mood and anxiety management.    Consider duloxetine 30 mg if prozac is not tolerated.  Continue psychotherapy for mood and anxiety management.  Medication options, alternatives (including no medications) and medication risks/benefits/side effects were discussed in detail.  Explained importance of contraceptive measures while on psychotropic medications, educated to let provider know if ever pregnant or wanting to become pregnant. Verbalized understanding.  The patient was advised to call, message provider on MIGS[facility], or come in to the clinic if symptoms worsen or if any future questions/issues  regarding their medications arise; the patient verbalized understanding and agreement.    The patient was educated to call 911, call the suicide hotline, or go to local ER if having thoughts of suicide or homicide; verbalized understanding.    Billing Coding based on:  52069 based on MDM    Return to clinic in 4-6 weeks or sooner if symptoms worsen.  Next Appointment: instruction provided on how to make the next appointment.     The proposed treatment plan was discussed with the patient who was provided the opportunity to ask questions and make suggestions regarding alternative treatment. Patient verbalized understanding and expressed agreement with the plan.       Buddy Brenard M.D.  09/23/22    This note was created using voice recognition software (Dragon). The accuracy of the dictation is limited by the abilities of the software. I have reviewed the note prior to signing, however some errors in grammar and context are still possible. If you have any questions related to this note please do not hesitate to contact our office.

## 2022-09-27 RX ORDER — LOSARTAN POTASSIUM AND HYDROCHLOROTHIAZIDE 12.5; 5 MG/1; MG/1
TABLET ORAL
Qty: 180 TABLET | Refills: 1 | Status: SHIPPED | OUTPATIENT
Start: 2022-09-27 | End: 2023-09-14

## 2022-09-27 NOTE — TELEPHONE ENCOUNTER
Received request via: Pharmacy  3/30/2022lov  Was the patient seen in the last year in this department? Yes    Does the patient have an active prescription (recently filled or refills available) for medication(s) requested? No

## 2022-10-21 ENCOUNTER — TELEMEDICINE (OUTPATIENT)
Dept: BEHAVIORAL HEALTH | Facility: CLINIC | Age: 32
End: 2022-10-21
Payer: COMMERCIAL

## 2022-10-21 DIAGNOSIS — F33.0 MILD EPISODE OF RECURRENT MAJOR DEPRESSIVE DISORDER (HCC): ICD-10-CM

## 2022-10-21 DIAGNOSIS — F41.1 GAD (GENERALIZED ANXIETY DISORDER): ICD-10-CM

## 2022-10-21 PROCEDURE — 90837 PSYTX W PT 60 MINUTES: CPT | Mod: GT | Performed by: MARRIAGE & FAMILY THERAPIST

## 2022-11-01 NOTE — PROGRESS NOTES
[facility]   Therapy Progress Note      This visit was conducted via Zoom using secure and encrypted videoconferencing technology.  The patient was in a private location in the [facility].  The patient's identity was confirmed and verbal consent was obtained for this virtual visit.  Place of Service: POS 10 -The patient is at Home during their visit         Name: You Sumner  MRN: [id number]  : 1990  Age: 31 y.o.  Date of assessment: 10/21/2022  PCP: Mitzy Wiggins P.A.-C.  Persons in attendance: Patient  Total session time: 57 minutes    Objective Observations:   Participation:Active verbal participation, Attentive, and Engaged   Grooming:Casual   Cognition:Alert and Fully Oriented   Eye Contact:Good   Mood:Euthymic and Happy   Affect:Flexible and Full range   Thought Process:Logical and Goal-directed   Speech:Rate within normal limits and Volume within normal limits    Current Risk:   Suicide: low   Homicide: low   Self-Harm: low   Relapse: low   Safety Plan Reviewed: not applicable    Topics addressed in psychotherapy include: Met with the patient via zoom for an individual counseling session.  The patient discussed feeling pretty good as he had planned his wife's birthday surprise party.  Patient appeared engaged in preparations of the celebration.  Patient reported that he enjoys doing for others such as his wife but not so much at work.  He reports enjoying taking charge of situation.  Discussed level of control in those situations has opposed to one's which he avoids.  Work with the patient on stretching into uncomfortable situations.    This dictation has been created using voice recognition software and/or scribes. The accuracy of the dictation is limited by the abilities of the software and the expertise of the scribes. I expect there may be some errors of grammar and possibly content. I made every attempt to manually correct the errors within my dictation.  However, errors related to voice recognition software and/or scribes may still exist and should be interpreted within the appropriate context.    Care Plan Updated: No    Does patient express agreement with the above plan? Yes     Diagnosis:  No diagnosis found.    Referral appointment(s) scheduled? No       BARRINGTON Dickson.

## 2022-11-17 ENCOUNTER — OFFICE VISIT (OUTPATIENT)
Dept: BEHAVIORAL HEALTH | Facility: CLINIC | Age: 32
End: 2022-11-17
Payer: COMMERCIAL

## 2022-11-17 DIAGNOSIS — F41.1 GAD (GENERALIZED ANXIETY DISORDER): ICD-10-CM

## 2022-11-17 DIAGNOSIS — F33.0 MILD EPISODE OF RECURRENT MAJOR DEPRESSIVE DISORDER (HCC): ICD-10-CM

## 2022-11-17 PROCEDURE — 90837 PSYTX W PT 60 MINUTES: CPT | Performed by: MARRIAGE & FAMILY THERAPIST

## 2022-11-18 DIAGNOSIS — F41.1 GAD (GENERALIZED ANXIETY DISORDER): ICD-10-CM

## 2022-11-18 DIAGNOSIS — F33.0 MILD EPISODE OF RECURRENT MAJOR DEPRESSIVE DISORDER (HCC): ICD-10-CM

## 2022-11-18 RX ORDER — FLUOXETINE HYDROCHLORIDE 40 MG/1
40 CAPSULE ORAL DAILY
Qty: 30 CAPSULE | Refills: 1 | Status: SHIPPED | OUTPATIENT
Start: 2022-11-18 | End: 2023-01-30 | Stop reason: SDUPTHER

## 2022-11-18 NOTE — PROGRESS NOTES
[facility]   Therapy Progress Note        Name: You Sumner  MRN: [id number]  : 1990  Age: 31 y.o.  Date of assessment: 2022  PCP: Mitzy Wiggins P.A.-C.  Persons in attendance: Patient  Total session time: 55 minutes      Topics addressed in psychotherapy include: Met with the patient in office for an individual counseling session.  The patient discussed that he had a rough start of the week with some of it stemming from interactions with his wife.  Discuss those interactions with the patient and different responses he could've taken.  Worked on stimulus and response with the patient.  He feels that he is in the general good mood until he comes home.  Worked with patient identify stressors and expectations at home.    This dictation has been created using voice recognition software and/or scribes. The accuracy of the dictation is limited by the abilities of the software and the expertise of the scribes. I expect there may be some errors of grammar and possibly content. I made every attempt to manually correct the errors within my dictation. However, errors related to voice recognition software and/or scribes may still exist and should be interpreted within the appropriate context.    Objective Observations:   Participation:Active verbal participation, Attentive, and Engaged   Grooming:Casual   Cognition:Alert and Fully Oriented   Eye Contact:Good   Mood:Anxious   Affect:Flexible and Full range   Thought Process:Logical and Goal-directed   Speech:Rate within normal limits and Volume within normal limits    Current Risk:   Suicide: low   Homicide: low   Self-Harm: low   Relapse: low   Safety Plan Reviewed: not applicable    Care Plan Updated: No    Does patient express agreement with the above plan? Yes     Diagnosis:  1. AVERY (generalized anxiety disorder)    2. Mild episode of recurrent major depressive disorder (HCC)        Referral appointment(s) scheduled? No        GOLDEN Dickson

## 2022-12-06 ENCOUNTER — OFFICE VISIT (OUTPATIENT)
Dept: SLEEP MEDICINE | Facility: MEDICAL CENTER | Age: 32
End: 2022-12-06
Payer: COMMERCIAL

## 2022-12-06 VITALS
DIASTOLIC BLOOD PRESSURE: 60 MMHG | RESPIRATION RATE: 16 BRPM | BODY MASS INDEX: 38.36 KG/M2 | WEIGHT: 315 LBS | HEIGHT: 76 IN | SYSTOLIC BLOOD PRESSURE: 130 MMHG | HEART RATE: 64 BPM | OXYGEN SATURATION: 96 %

## 2022-12-06 DIAGNOSIS — G47.33 OSA (OBSTRUCTIVE SLEEP APNEA): ICD-10-CM

## 2022-12-06 PROCEDURE — 99213 OFFICE O/P EST LOW 20 MIN: CPT | Performed by: NURSE PRACTITIONER

## 2022-12-06 ASSESSMENT — FIBROSIS 4 INDEX: FIB4 SCORE: 0.58

## 2022-12-06 NOTE — PROGRESS NOTES
"Chief Complaint   Patient presents with    Apnea       HPI:  You Sumner is a 32 y.o. year old male here today for follow-up on DEBORAH with first compliance on new CPAP.  Last seen 8/12/2022 by Dr. Roberts.  Patient and ordered auto CPAP 5 to 15 cm/H2O at last visit.  Home sleep study showed evidence of severe DEBORAH with an MARC of 33.4 and an O2 jyoti of 68%.  Patient was below 88% for 40 minutes of total evaluation time.    Currently using an over the nose nasal mask and denies any difficulty with mask fit or pressures.  He sleeps 6 to 7 hours nightly and does have occasional headaches due to sinus problems, but denies any morning headaches, palpitations, concentration or memory problems.  He does note significant improvement in his energy levels.  He denies any excessive daytime sleepiness.  He used to have to drink several cans of Monster energy drinks to stay awake during the day and now he is only using drinking 1 about every 6 days.    Compliance was reviewed and does show 97% use for the last 30 days with an average time of 5 hours and 21 minutes.  AHI of 1.2.  There is no evidence of excessive leakage.       ROS: As per HPI and otherwise negative if not stated.    Past Medical History:   Diagnosis Date    Chickenpox     Head ache     Seen by marriage guidance counselor 02/24/2022       Past Surgical History:   Procedure Laterality Date    APPENDECTOMY         Family History   Problem Relation Age of Onset    Sleep Apnea Mother     Sleep Apnea Father     Hyperlipidemia Father     Hypertension Father     Heart Disease Father     No Known Problems Sister     No Known Problems Sister     No Known Problems Brother        Allergies as of 12/06/2022    (No Known Allergies)        Vitals:  /60 (BP Location: Left arm, Patient Position: Sitting, BP Cuff Size: Large adult)   Pulse 64   Resp 16   Ht 1.93 m (6' 4\")   Wt (!) 147 kg (324 lb 14.4 oz)   SpO2 96%     Current medications as of today "   Current Outpatient Medications   Medication Sig Dispense Refill    fluoxetine (PROZAC) 40 MG capsule TAKE 1 CAPSULE BY MOUTH EVERY DAY 30 Capsule 1    losartan-hydrochlorothiazide (HYZAAR) 50-12.5 MG per tablet TAKE TWO TABLETS BY MOUTH DAILY 180 Tablet 1    albuterol 108 (90 Base) MCG/ACT Aero Soln inhalation aerosol Inhale 2 Puffs every four hours as needed for Shortness of Breath. 1 Each 0    Aspirin-Acetaminophen-Caffeine (EXCEDRIN PO) Take  by mouth.      losartan-hydrochlorothiazide (HYZAAR) 100-25 MG per tablet TAKE ONE TABLET BY MOUTH ONCE A DAY (Patient not taking: Reported on 12/6/2022) 30 Tablet 2     No current facility-administered medications for this visit.         Physical Exam:   Gen:           Alert and oriented, No apparent distress. Mood and affect appropriate, normal interaction with examiner.  Eyes:          PERRL, EOM intact, sclere white, conjunctive moist.  Ears:          Not examined.   Hearing:     Grossly intact.  Nose:          Normal, no lesions or deformities.  Dentition:    Good dentition.  Oropharynx:   Tongue normal, posterior pharynx without erythema or exudate.  Neck:        Supple, trachea midline, no masses.  Respiratory Effort: No intercostal retractions or use of accessory muscles.   Lung Auscultation:      Clear to auscultation bilaterally; no rales, rhonchi or wheezing.  CV:            Regular rate and rhythm. No murmurs, rubs or gallops.  Abd:           Not examined.   Lymphadenopathy: Not examined.  Gait and Station: Normal.  Digits and Nails: No clubbing, cyanosis, petechiae, or nodes.   Cranial Nerves: II-XII grossly intact.  Skin:        No rashes, lesions or ulcers noted.               Ext:           No cyanosis or edema.      Assessment:  1. DEBORAH (obstructive sleep apnea)              Plan:  Patient is using and benefiting from CPAP therapy.  Compliance shows adequate use and control of DEBORAH.  Patient advised to come in in 10 months for annual DEBORAH visit, but can be  seen sooner if needed.  Patient notes improved sleep quality with using CPAP.    Please note that this dictation was created using voice recognition software. I have made every reasonable attempt to correct obvious errors, but it is possible there are errors of grammar and possibly content that I did not discover before finalizing the note.  \

## 2022-12-20 ENCOUNTER — OFFICE VISIT (OUTPATIENT)
Dept: BEHAVIORAL HEALTH | Facility: CLINIC | Age: 32
End: 2022-12-20
Payer: COMMERCIAL

## 2022-12-20 DIAGNOSIS — F33.0 MILD EPISODE OF RECURRENT MAJOR DEPRESSIVE DISORDER (HCC): ICD-10-CM

## 2022-12-20 DIAGNOSIS — F41.1 GAD (GENERALIZED ANXIETY DISORDER): ICD-10-CM

## 2022-12-20 PROCEDURE — 90837 PSYTX W PT 60 MINUTES: CPT | Performed by: MARRIAGE & FAMILY THERAPIST

## 2022-12-21 NOTE — PROGRESS NOTES
[facility]   Therapy Progress Note        Name: You Sumner  MRN: [id number]  : 1990  Age: 32 y.o.  Date of assessment: 2022  PCP: Mitzy Wiggins P.A.-C.  Persons in attendance: Patient  Total session time: 55 minutes      Topics addressed in psychotherapy include: Met with the patient in office for an individual counseling session.  The patient discussed that he feels he is doing exceptionally well.  He and his wife have appropriate individual activities and time.  He feels that he has been more attentive to her needs which has had a positive effect on their relationship and on his own mental health.  The patient feels that he is shutting down much less and is tolerant in processing stressors.  Worked with the patient on identifying means of those actions and continuing success.     been created using voice recognition software and/or scribes. The accuracy of the dictation is limited by the abilities of the software and the expertise of the scribes. I expect there may be some errors of grammar and possibly content. I made every attempt to manually correct the errors within my dictation. However, errors related to voice recognition software and/or scribes may still exist and should be interpreted within the appropriate context.    Objective Observations:   Participation:Active verbal participation, Attentive, and Engaged   Grooming:Casual   Cognition:Alert and Fully Oriented   Eye Contact:Good   Mood:Happy   Affect:Flexible, Full range, Bright, and Happy   Thought Process:Logical and Goal-directed   Speech:Rate within normal limits and Volume within normal limits    Current Risk:   Suicide: low   Homicide: low   Self-Harm: low   Relapse: low   Safety Plan Reviewed: not applicable    Care Plan Updated: No    Does patient express agreement with the above plan? Yes     Diagnosis:  1. AVERY (generalized anxiety disorder)    2. Mild episode of recurrent major depressive  disorder (HCC)        Referral appointment(s) scheduled? No       BARRINGTON Dickson.

## 2023-01-30 DIAGNOSIS — F33.0 MILD EPISODE OF RECURRENT MAJOR DEPRESSIVE DISORDER (HCC): ICD-10-CM

## 2023-01-30 DIAGNOSIS — F41.1 GAD (GENERALIZED ANXIETY DISORDER): ICD-10-CM

## 2023-01-31 RX ORDER — FLUOXETINE HYDROCHLORIDE 40 MG/1
40 CAPSULE ORAL DAILY
Qty: 30 CAPSULE | Refills: 1 | Status: SHIPPED | OUTPATIENT
Start: 2023-01-31 | End: 2023-02-03 | Stop reason: SDUPTHER

## 2023-01-31 NOTE — TELEPHONE ENCOUNTER
Pt has upcoming appt 2/3/23.    Received request via: Patient    Was the patient seen in the last year in this department? Yes    Does the patient have an active prescription (recently filled or refills available) for medication(s) requested? No    Does the patient have [facility] and need 100 day supply (blood pressure, diabetes and cholesterol meds only)? Medication is not for cholesterol, blood pressure or diabetes and Patient does not have SCP

## 2023-02-03 ENCOUNTER — TELEMEDICINE (OUTPATIENT)
Dept: BEHAVIORAL HEALTH | Facility: CLINIC | Age: 33
End: 2023-02-03
Payer: COMMERCIAL

## 2023-02-03 DIAGNOSIS — F41.1 GAD (GENERALIZED ANXIETY DISORDER): ICD-10-CM

## 2023-02-03 DIAGNOSIS — F33.0 MILD EPISODE OF RECURRENT MAJOR DEPRESSIVE DISORDER (HCC): ICD-10-CM

## 2023-02-03 DIAGNOSIS — F33.42 RECURRENT MAJOR DEPRESSIVE DISORDER, IN FULL REMISSION (HCC): ICD-10-CM

## 2023-02-03 DIAGNOSIS — R41.840 INATTENTION: ICD-10-CM

## 2023-02-03 PROCEDURE — 99214 OFFICE O/P EST MOD 30 MIN: CPT | Mod: GT | Performed by: PSYCHIATRY & NEUROLOGY

## 2023-02-03 RX ORDER — FLUOXETINE HYDROCHLORIDE 40 MG/1
40 CAPSULE ORAL DAILY
Qty: 90 CAPSULE | Refills: 1 | Status: SHIPPED | OUTPATIENT
Start: 2023-02-03 | End: 2023-03-29

## 2023-02-03 NOTE — PROGRESS NOTES
This evaluation was conducted via Zoom using secure and encrypted videoconferencing technology. The patient was in a private location outside of their home in the state of [place].    The patient's identity was confirmed and verbal consent was obtained for this virtual visit.      PSYCHIATRY FOLLOW-UP NOTE      Name: You Sumner  MRN: [id number]  : 1990  Age: 32 y.o.  Date of assessment: 2/3/2023  PCP: Mitzy Wiggins P.A.-C.  Persons in attendance: Patient      REASON FOR VISIT/CHIEF COMPLAINT (as stated by Patient):  You Sumner is a 32 y.o., White male, attending follow-up appointment for mood and anxiety management.      HISTORY OF PRESENT ILLNESS:  You Sumner is a 32 y.o. old male with MDD and AVERY comes in today for follow up. Patient was last seen 4 months ago, and following treatment planning recommendations were done:  Increase Prozac to 40 mg daily for mood and anxiety management.    Consider duloxetine 30 mg if prozac is not tolerated.  Continue psychotherapy for mood and anxiety management.      Patient is compliant with Prozac 40 mg with good response and describes stable mood and anxiety.  Patient's only concern as inattention symptoms occasionally as he was diagnosed with ADD in childhood.  During childhood patient have symptoms consistent with hyperactive ADHD but that has resolved but patient and wife have noticed symptoms of inattention and agreed with getting psychological testing done as patient's mood is stable and he is under treatment for sleep apnea with CPAP with good response.      CURRENT MEDICATIONS:  Current Outpatient Medications   Medication Sig Dispense Refill    fluoxetine (PROZAC) 40 MG capsule Take 1 Capsule by mouth every day. 30 Capsule 1    losartan-hydrochlorothiazide (HYZAAR) 50-12.5 MG per tablet TAKE TWO TABLETS BY MOUTH DAILY 180 Tablet 1    albuterol 108 (90 Base) MCG/ACT Aero Soln inhalation aerosol Inhale 2 Puffs  every four hours as needed for Shortness of Breath. 1 Each 0    losartan-hydrochlorothiazide (HYZAAR) 100-25 MG per tablet TAKE ONE TABLET BY MOUTH ONCE A DAY (Patient not taking: Reported on 12/6/2022) 30 Tablet 2    Aspirin-Acetaminophen-Caffeine (EXCEDRIN PO) Take  by mouth.       No current facility-administered medications for this visit.       MEDICAL HISTORY  Past Medical History:   Diagnosis Date    Chickenpox     Head ache     Seen by marriage guidance counselor 02/24/2022     Past Surgical History:   Procedure Laterality Date    APPENDECTOMY         PAST PSYCHIATRIC HISTORY  Prior psychiatric hospitalization: no  Prior Self harm/suicide attempt: no  Prior Diagnosis: Depression, ADHD     PAST PSYCHIATRIC MEDICATIONS  Prozac, Lexapro  Ritalin      FAMILY HISTORY  Psychiatric diagnosis:  no  History of suicide attempts:  no  Substance abuse history:  no     SUBSTANCE USE HISTORY:  History of excessive alcohol abuse with blackout in his 20s  Reports occasional alcohol use now and denies any drug abuse     SOCIAL HISTORY  Born in [place]  Currently in [facility]  Working for [employer]   with no kids  Describes poor relationship with father      REVIEW OF SYSTEMS:        Constitutional negative   Eyes negative   Ears/Nose/Mouth/Throat negative   Cardiovascular negative   Respiratory negative   Gastrointestinal negative   Genitourinary  DEBORAH on CPAP   Muscular negative   Integumentary negative   Neurological negative   Endocrine negative   Hematologic/Lymphatic negative     PHYSICAL EXAMINAION:  Vital signs: There were no vitals taken for this visit.  Musculoskeletal: Normal gait.   Abnormal movements: none      MENTAL STATUS EXAMINATION      General:   - Grooming and hygiene: Casual,   - Apparent distress: none,   - Behavior: Calm  - Eye Contact:  Good,   - no psychomotor agitation or retardation    - Participation: Active verbal participation  Orientation: Alert and Fully Oriented to person, place and  time  Mood: Euthymic  Affect: Flexible and Full range,  Thought Process: Logical and Goal-directed  Thought Content: Denies suicidal or homicidal ideations, intent or plan   Perception: Denies auditory or visual hallucinations. No delusions noted   Attention span and concentration: Intact   Speech:Rate within normal limits and Volume within normal limits  Language: Appropriate   Insight: Good  Judgment: Good  Recent and remote memory: No gross evidence of memory deficits        DEPRESSION SCREENIN/14/2022 8/10/2022 2022   Depression Screen (PHQ-2/PHQ-9)   PHQ-2 Total Score 1 2 2   PHQ-9 Total Score 14 9 9       Multiple values from one day are sorted in reverse-chronological order       Interpretation of PHQ-9 Total Score   Score Severity   1-4 No Depression   5-9 Mild Depression   10-14 Moderate Depression   15-19 Moderately Severe Depression   20-27 Severe Depression    CURRENT RISK:       Suicidal: Low       Homicidal: Low       Self-Harm: Low       Relapse: Low       Crisis Safety Plan Reviewed Not Indicated       If evidence of imminent risk is present, intervention/plan:      MEDICAL RECORDS/LABS/DIAGNOSTIC TESTS REVIEWED:  No new lab since last visit     [place]  records -   Reviewed     PLAN:  (1) MDD; (2) Inattention  Stable  Continue Prozac 40 mg daily for mood and anxiety management.    Referral placed for psychological testing to confirm or rule out ADHD.  Continue psychotherapy for mood and anxiety management.  Medication options, alternatives (including no medications) and medication risks/benefits/side effects were discussed in detail.  Explained importance of contraceptive measures while on psychotropic medications, educated to let provider know if ever pregnant or wanting to become pregnant. Verbalized understanding.  The patient was advised to call, message provider on [employer], or come in to the clinic if symptoms worsen or if any future questions/issues regarding their medications  arise; the patient verbalized understanding and agreement.    The patient was educated to call 911, call the suicide hotline, or go to local ER if having thoughts of suicide or homicide; verbalized understanding.    Billing Coding based on:  81263 based on MDM    Return to clinic in 2-3 months or sooner if symptoms worsen.  Next Appointment: instruction provided on how to make the next appointment.     The proposed treatment plan was discussed with the patient who was provided the opportunity to ask questions and make suggestions regarding alternative treatment. Patient verbalized understanding and expressed agreement with the plan.       Buddy Bernard M.D.  02/03/23    This note was created using voice recognition software (Dragon). The accuracy of the dictation is limited by the abilities of the software. I have reviewed the note prior to signing, however some errors in grammar and context are still possible. If you have any questions related to this note please do not hesitate to contact our office.

## 2023-03-16 ENCOUNTER — OFFICE VISIT (OUTPATIENT)
Dept: BEHAVIORAL HEALTH | Facility: CLINIC | Age: 33
End: 2023-03-16
Payer: COMMERCIAL

## 2023-03-16 DIAGNOSIS — F33.42 RECURRENT MAJOR DEPRESSIVE DISORDER, IN FULL REMISSION (HCC): ICD-10-CM

## 2023-03-16 DIAGNOSIS — F41.1 GAD (GENERALIZED ANXIETY DISORDER): ICD-10-CM

## 2023-03-16 PROCEDURE — 90837 PSYTX W PT 60 MINUTES: CPT | Performed by: MARRIAGE & FAMILY THERAPIST

## 2023-03-17 NOTE — PROGRESS NOTES
[facility]   Therapy Progress Note        Name: You Sumner  MRN: [id number]  : 1990  Age: 32 y.o.  Date of assessment: 3/16/2023  PCP: Mitzy Wiggins P.A.-C.  Persons in attendance: Patient  Total session time: 57 minutes      Topics addressed in psychotherapy include: Met with the patient in office for an individual counseling session.  The patient discussed stressors at work.  Validated the patient's emotions.  Patient discussed recent increasing fears of death.  Not of his own but of those around him.  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  Discuss mortality with the patient and the need to live life in its fullest every day.  Worked on the concept of remember death (Memento Rohit) and remember life (Memento Vivre).  Patient discussed upcoming plans for the weekend of self care and fun with friends.    This dictation has been created using voice recognition software and/or scribes. The accuracy of the dictation is limited by the abilities of the software and the expertise of the scribes. I expect there may be some errors of grammar and possibly content. I made every attempt to manually correct the errors within my dictation. However, errors related to voice recognition software and/or scribes may still exist and should be interpreted within the appropriate context.    Objective Observations:   Participation:Active verbal participation, Attentive, Engaged, and thoughtful   Grooming:Casual   Cognition:Alert and Fully Oriented   Eye Contact:Good   Mood:Euthymic   Affect:Flexible and Full range   Thought Process:Logical and Goal-directed   Speech:Rate within normal limits and Volume within normal limits    Current Risk:   Suicide: low   Homicide: low   Self-Harm: low   Relapse: low   Safety Plan Reviewed: not applicable    Care Plan Updated: No    Does patient express agreement with the above plan? Yes      Diagnosis:  1. AVERY (generalized anxiety disorder)    2. Recurrent major depressive disorder, in full remission (HCC)        Referral appointment(s) scheduled? BARRINGTON Lam.

## 2023-04-13 ENCOUNTER — OFFICE VISIT (OUTPATIENT)
Dept: MEDICAL GROUP | Facility: MEDICAL CENTER | Age: 33
End: 2023-04-13
Payer: COMMERCIAL

## 2023-04-13 VITALS
SYSTOLIC BLOOD PRESSURE: 128 MMHG | HEART RATE: 73 BPM | OXYGEN SATURATION: 96 % | BODY MASS INDEX: 38.36 KG/M2 | WEIGHT: 315 LBS | HEIGHT: 76 IN | TEMPERATURE: 98.2 F | DIASTOLIC BLOOD PRESSURE: 74 MMHG

## 2023-04-13 DIAGNOSIS — Z99.89 CPAP (CONTINUOUS POSITIVE AIRWAY PRESSURE) DEPENDENCE: ICD-10-CM

## 2023-04-13 DIAGNOSIS — L81.9 DISCOLORED SKIN: ICD-10-CM

## 2023-04-13 DIAGNOSIS — G47.33 OBSTRUCTIVE SLEEP APNEA SYNDROME: ICD-10-CM

## 2023-04-13 DIAGNOSIS — R53.83 OTHER FATIGUE: ICD-10-CM

## 2023-04-13 DIAGNOSIS — Z11.59 NEED FOR HEPATITIS C SCREENING TEST: ICD-10-CM

## 2023-04-13 DIAGNOSIS — E66.01 MORBID OBESITY WITH BMI OF 40.0-44.9, ADULT (HCC): ICD-10-CM

## 2023-04-13 PROBLEM — R05.9 COUGH: Status: RESOLVED | Noted: 2022-03-30 | Resolved: 2023-04-13

## 2023-04-13 PROBLEM — I10 BENIGN ESSENTIAL HTN: Chronic | Status: ACTIVE | Noted: 2021-05-27

## 2023-04-13 PROCEDURE — 99214 OFFICE O/P EST MOD 30 MIN: CPT

## 2023-04-13 ASSESSMENT — ENCOUNTER SYMPTOMS
SHORTNESS OF BREATH: 0
CHILLS: 0
PALPITATIONS: 0
ORTHOPNEA: 0
COUGH: 0
FEVER: 0

## 2023-04-13 ASSESSMENT — FIBROSIS 4 INDEX: FIB4 SCORE: 0.58

## 2023-04-13 ASSESSMENT — PATIENT HEALTH QUESTIONNAIRE - PHQ9: CLINICAL INTERPRETATION OF PHQ2 SCORE: 0

## 2023-04-13 NOTE — PROGRESS NOTES
Subjective:     CC: Establish Care (Prior pcp : edil acevedo ) and Skin Discoloration (Pt's wife reports noticing a change in his skin color (yellowish) x 2 mths )      HPI:   You is a 32 y.o. male who presents today for: his wife reports yellowing of his skin for 2 months, dark circles under his eyes, fatigue, bad breath. He reports being exteremly fatigued and needing to take naps daily ranging form 30 minutes to 3 hours. He does use a CPAP and reports getting 8 hrs of sleep per night, he last saw the pulmonologist 12/22. He does not use his cpap when he naps. He is scheduled to se them again in October.  He states that the fatigue has been progressively getting worse, and he gets easily fatigued while helping with chores around the house.  His wife noted that he had discoloration or yellowing of his skin for the last 2 months.  In office today, his skin does not appear to be jaundice, and his eyes are not jaundiced.  He is requesting blood work.  We discussed that his weight may be contributing to his fatigue as well.  Patient states that he plans to work on cutting back on soda intake and adjusting his diet as well as trying to increase his exercise.    Allergies: Patient has no known allergies.     Medications:   Current Outpatient Medications:     fluoxetine (PROZAC) 40 MG capsule, TAKE 1 CAPSULE BY MOUTH EVERY DAY, Disp: 90 Capsule, Rfl: 1    losartan-hydrochlorothiazide (HYZAAR) 50-12.5 MG per tablet, TAKE TWO TABLETS BY MOUTH DAILY, Disp: 180 Tablet, Rfl: 1    albuterol 108 (90 Base) MCG/ACT Aero Soln inhalation aerosol, Inhale 2 Puffs every four hours as needed for Shortness of Breath., Disp: 1 Each, Rfl: 0    Aspirin-Acetaminophen-Caffeine (EXCEDRIN PO), Take  by mouth., Disp: , Rfl:       ROS:  Review of Systems   Constitutional:  Negative for chills and fever.   Respiratory:  Negative for cough and shortness of breath.    Cardiovascular:  Negative for chest pain, palpitations, orthopnea  "and leg swelling.     Objective:     Exam:  /74 (BP Location: Left arm, Patient Position: Sitting, BP Cuff Size: Adult)   Pulse 73   Temp 36.8 °C (98.2 °F) (Temporal)   Ht 1.93 m (6' 4\")   Wt (!) 152 kg (335 lb)   SpO2 96%   BMI 40.78 kg/m²  Body mass index is 40.78 kg/m².    Physical Exam  Constitutional:       Appearance: He is normal weight.   Eyes:      Pupils: Pupils are equal, round, and reactive to light.   Cardiovascular:      Rate and Rhythm: Normal rate and regular rhythm.      Pulses: Normal pulses.      Heart sounds: Normal heart sounds.   Pulmonary:      Effort: Pulmonary effort is normal.      Breath sounds: Normal breath sounds.   Neurological:      Mental Status: He is alert and oriented to person, place, and time.   Psychiatric:         Mood and Affect: Mood normal.         Behavior: Behavior normal.         Assessment & Plan:     You nelson 32 y.o. male with the following -     1. Other fatigue  Undiagnosed problem with uncertain prognosis  - TSH WITH REFLEX TO FT4; Future  - Comp Metabolic Panel; Future    2. Discolored skin  Undiagnosed problem with uncertain prognosis  - HEPATIC FUNCTION PANEL; Future    3. Obstructive sleep apnea syndrome  4. CPAP (continuous positive airway pressure) dependence  Chronic, stable  -Discussed following up with pulmonology sooner to see if his CPAP needs to be adjusted to help with the fatigue.    5. Morbid obesity with BMI of 40.0-44.9, adult (HCC)  Chronic, stable  - HEMOGLOBIN A1C; Future  - TSH WITH REFLEX TO FT4; Future  - Comp Metabolic Panel; Future  - Lipid Profile; Future  - CBC WITHOUT DIFFERENTIAL; Future  - Patient identified as having weight management issue.  Appropriate orders and counseling given.    6. Need for hepatitis C screening test  - HEP C VIRUS ANTIBODY; Future        Anticipatory guidance included the following: Patient counseled about skin care, diet, supplements, smoking, drugs/alcohol use, safe sex and exercise. "     Return in about 3 weeks (around 5/4/2023).    Please note that this dictation was created using voice recognition software. I have made every reasonable attempt to correct obvious errors, but I expect that there are errors of grammar and possibly content that I did not discover before finalizing the note.

## 2023-04-15 ENCOUNTER — HOSPITAL ENCOUNTER (OUTPATIENT)
Dept: LAB | Facility: MEDICAL CENTER | Age: 33
End: 2023-04-15
Payer: COMMERCIAL

## 2023-04-15 DIAGNOSIS — Z11.59 NEED FOR HEPATITIS C SCREENING TEST: ICD-10-CM

## 2023-04-15 DIAGNOSIS — L81.9 DISCOLORED SKIN: ICD-10-CM

## 2023-04-15 DIAGNOSIS — R53.83 OTHER FATIGUE: ICD-10-CM

## 2023-04-15 DIAGNOSIS — E66.01 MORBID OBESITY WITH BMI OF 40.0-44.9, ADULT (HCC): ICD-10-CM

## 2023-04-15 LAB
ALBUMIN SERPL BCP-MCNC: 4.2 G/DL (ref 3.2–4.9)
ALBUMIN/GLOB SERPL: 1.3 G/DL
ALP SERPL-CCNC: 81 U/L (ref 30–99)
ALT SERPL-CCNC: 28 U/L (ref 2–50)
ANION GAP SERPL CALC-SCNC: 12 MMOL/L (ref 7–16)
AST SERPL-CCNC: 26 U/L (ref 12–45)
BILIRUB CONJ SERPL-MCNC: <0.2 MG/DL (ref 0.1–0.5)
BILIRUB INDIRECT SERPL-MCNC: NORMAL MG/DL (ref 0–1)
BILIRUB SERPL-MCNC: 0.5 MG/DL (ref 0.1–1.5)
BUN SERPL-MCNC: 14 MG/DL (ref 8–22)
CALCIUM ALBUM COR SERPL-MCNC: 9.2 MG/DL (ref 8.5–10.5)
CALCIUM SERPL-MCNC: 9.4 MG/DL (ref 8.5–10.5)
CHLORIDE SERPL-SCNC: 104 MMOL/L (ref 96–112)
CHOLEST SERPL-MCNC: 176 MG/DL (ref 100–199)
CO2 SERPL-SCNC: 25 MMOL/L (ref 20–33)
CREAT SERPL-MCNC: 0.88 MG/DL (ref 0.5–1.4)
ERYTHROCYTE [DISTWIDTH] IN BLOOD BY AUTOMATED COUNT: 41.9 FL (ref 35.9–50)
EST. AVERAGE GLUCOSE BLD GHB EST-MCNC: 117 MG/DL
FASTING STATUS PATIENT QL REPORTED: NORMAL
GFR SERPLBLD CREATININE-BSD FMLA CKD-EPI: 118 ML/MIN/1.73 M 2
GLOBULIN SER CALC-MCNC: 3.2 G/DL (ref 1.9–3.5)
GLUCOSE SERPL-MCNC: 78 MG/DL (ref 65–99)
HBA1C MFR BLD: 5.7 % (ref 4–5.6)
HCT VFR BLD AUTO: 43.9 % (ref 42–52)
HCV AB SER QL: NORMAL
HDLC SERPL-MCNC: 36 MG/DL
HGB BLD-MCNC: 14.2 G/DL (ref 14–18)
LDLC SERPL CALC-MCNC: 117 MG/DL
MCH RBC QN AUTO: 26.5 PG (ref 27–33)
MCHC RBC AUTO-ENTMCNC: 32.3 G/DL (ref 33.7–35.3)
MCV RBC AUTO: 81.9 FL (ref 81.4–97.8)
PLATELET # BLD AUTO: 281 K/UL (ref 164–446)
PMV BLD AUTO: 9.7 FL (ref 9–12.9)
POTASSIUM SERPL-SCNC: 4.1 MMOL/L (ref 3.6–5.5)
PROT SERPL-MCNC: 7.4 G/DL (ref 6–8.2)
RBC # BLD AUTO: 5.36 M/UL (ref 4.7–6.1)
SODIUM SERPL-SCNC: 141 MMOL/L (ref 135–145)
TRIGL SERPL-MCNC: 113 MG/DL (ref 0–149)
TSH SERPL DL<=0.005 MIU/L-ACNC: 1.49 UIU/ML (ref 0.38–5.33)
WBC # BLD AUTO: 5.9 K/UL (ref 4.8–10.8)

## 2023-04-15 PROCEDURE — 85027 COMPLETE CBC AUTOMATED: CPT

## 2023-04-15 PROCEDURE — 86803 HEPATITIS C AB TEST: CPT

## 2023-04-15 PROCEDURE — 84443 ASSAY THYROID STIM HORMONE: CPT

## 2023-04-15 PROCEDURE — 36415 COLL VENOUS BLD VENIPUNCTURE: CPT

## 2023-04-15 PROCEDURE — 80061 LIPID PANEL: CPT

## 2023-04-15 PROCEDURE — 83036 HEMOGLOBIN GLYCOSYLATED A1C: CPT

## 2023-04-15 PROCEDURE — 82248 BILIRUBIN DIRECT: CPT

## 2023-04-15 PROCEDURE — 80053 COMPREHEN METABOLIC PANEL: CPT

## 2023-04-27 ENCOUNTER — OFFICE VISIT (OUTPATIENT)
Dept: BEHAVIORAL HEALTH | Facility: CLINIC | Age: 33
End: 2023-04-27
Payer: COMMERCIAL

## 2023-04-27 DIAGNOSIS — F33.42 RECURRENT MAJOR DEPRESSIVE DISORDER, IN FULL REMISSION (HCC): ICD-10-CM

## 2023-04-27 DIAGNOSIS — F41.1 GAD (GENERALIZED ANXIETY DISORDER): ICD-10-CM

## 2023-04-27 PROCEDURE — 90837 PSYTX W PT 60 MINUTES: CPT | Performed by: MARRIAGE & FAMILY THERAPIST

## 2023-04-28 NOTE — PROGRESS NOTES
[facility]   Therapy Progress Note        Name: You Sumner  MRN: [id number]  : 1990  Age: 32 y.o.  Date of assessment: 2023  PCP: VIOLETA Mendoza  Persons in attendance: Patient  Total session time: 57 minutes      Topics addressed in psychotherapy include: Met with the patient in office for an individual counseling session.  The patient discussed frustration with his school achievement thus far he anticipates that taking him approximately another 7 to 9 years to complete his bachelor's degree on a part time basis.  He discussed stressors related to a roommate moving out and his current finances.  The patient feels that he has enough money to meet his needs however wages do not meet needs [employer].  Worked with the patient to accept that this time is not wasted as he moves forward in life.  Discussed successful individuals who started careers/success later in life.    This dictation has been created using voice recognition software and/or scribes. The accuracy of the dictation is limited by the abilities of the software and the expertise of the scribes. I expect there may be some errors of grammar and possibly content. I made every attempt to manually correct the errors within my dictation. However, errors related to voice recognition software and/or scribes may still exist and should be interpreted within the appropriate context.    Objective Observations:   Participation:Active verbal participation, Attentive, Engaged, and Open to feedback   Grooming:Casual   Cognition:Alert and Fully Oriented   Eye Contact:Good   Mood:Depressed   Affect:Flexible, Full range, Congruent with content, Sad, and Anxious   Thought Process:Logical and Goal-directed   Speech:Rate within normal limits and Volume within normal limits    Current Risk:   Suicide: low   Homicide: low   Self-Harm: low   Relapse: low   Safety Plan Reviewed: not applicable    Care Plan Updated: No    Does  patient express agreement with the above plan? Yes     Diagnosis:  1. Recurrent major depressive disorder, in full remission (HCC)    2. AVERY (generalized anxiety disorder)        Referral appointment(s) scheduled? BARRINGTON Lam.

## 2023-06-05 ENCOUNTER — OFFICE VISIT (OUTPATIENT)
Dept: BEHAVIORAL HEALTH | Facility: CLINIC | Age: 33
End: 2023-06-05
Payer: COMMERCIAL

## 2023-06-05 DIAGNOSIS — F33.42 RECURRENT MAJOR DEPRESSIVE DISORDER, IN FULL REMISSION (HCC): ICD-10-CM

## 2023-06-05 DIAGNOSIS — F41.1 GAD (GENERALIZED ANXIETY DISORDER): ICD-10-CM

## 2023-06-05 PROCEDURE — 96137 PSYCL/NRPSYC TST PHY/QHP EA: CPT | Performed by: PSYCHOLOGIST

## 2023-06-05 PROCEDURE — 96130 PSYCL TST EVAL PHYS/QHP 1ST: CPT | Performed by: PSYCHOLOGIST

## 2023-06-05 PROCEDURE — 96136 PSYCL/NRPSYC TST PHY/QHP 1ST: CPT | Performed by: PSYCHOLOGIST

## 2023-06-05 PROCEDURE — 90791 PSYCH DIAGNOSTIC EVALUATION: CPT | Performed by: PSYCHOLOGIST
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
## 2023-06-08 ENCOUNTER — OFFICE VISIT (OUTPATIENT)
Dept: BEHAVIORAL HEALTH | Facility: CLINIC | Age: 33
End: 2023-06-08
Payer: COMMERCIAL

## 2023-06-08 DIAGNOSIS — F33.42 RECURRENT MAJOR DEPRESSIVE DISORDER, IN FULL REMISSION (HCC): ICD-10-CM

## 2023-06-08 DIAGNOSIS — F41.1 GAD (GENERALIZED ANXIETY DISORDER): ICD-10-CM

## 2023-06-08 PROCEDURE — 90837 PSYTX W PT 60 MINUTES: CPT | Performed by: MARRIAGE & FAMILY THERAPIST
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
## 2023-06-14 ENCOUNTER — TELEMEDICINE (OUTPATIENT)
Dept: BEHAVIORAL HEALTH | Facility: CLINIC | Age: 33
End: 2023-06-14
Payer: COMMERCIAL

## 2023-06-14 DIAGNOSIS — F33.42 RECURRENT MAJOR DEPRESSIVE DISORDER, IN FULL REMISSION (HCC): ICD-10-CM

## 2023-06-14 DIAGNOSIS — F41.1 GAD (GENERALIZED ANXIETY DISORDER): ICD-10-CM

## 2023-06-14 PROCEDURE — 99214 OFFICE O/P EST MOD 30 MIN: CPT | Mod: GT | Performed by: PSYCHIATRY & NEUROLOGY

## 2023-06-14 RX ORDER — FLUOXETINE HYDROCHLORIDE 40 MG/1
40 CAPSULE ORAL DAILY
Qty: 90 CAPSULE | Refills: 1 | Status: SHIPPED | OUTPATIENT
Start: 2023-06-14 | End: 2023-12-18

## 2023-06-14 NOTE — PROGRESS NOTES
This evaluation was conducted via Zoom using secure and encrypted videoconferencing technology. The patient was in their home in the [facility].    The patient's identity was confirmed and verbal consent was obtained for this virtual visit.      PSYCHIATRY FOLLOW-UP NOTE      Name: You Sumner  MRN: [id number]  : 1990  Age: 32 y.o.  Date of assessment: 2023  PCP: VIOLETA Mendoza  Persons in attendance: Patient      REASON FOR VISIT/CHIEF COMPLAINT (as stated by Patient):  You Sumner is a 32 y.o., White male, attending follow-up appointment for mood and anxiety management.      HISTORY OF PRESENT ILLNESS:  You Sumner is a 32 y.o. old male with MDD and inattention comes in today for follow up. Patient was last seen 4 months ago, and following treatment planning recommendations were done:  Continue Prozac 40 mg daily for mood and anxiety management.    Referral placed for psychological testing to confirm or rule out ADHD.  Continue psychotherapy for mood and anxiety management.    Psychological Testing (23):  Based on the retrospective analysis, intake, review of self-report measures, and the behavioral observations and results of testing, patient does NOT meet criteria for adult ADHD.  Overall, patient agreed that a mood based hypothesis is a better fit for their symptoms than the ADHD hypothesis.  Diagnostic Impression: MDD, AVERY      Patient is compliant with Prozac with good response and had seen improved mood and anxiety.  Discussed the psychological testing results and patient agreed with him not meeting criteria for ADHD.  Agreed with future plan of titrating Prozac if increasing stressors are seen.  Motivated to continue psychotherapy with daily medication compliance.      CURRENT MEDICATIONS:  Current Outpatient Medications   Medication Sig Dispense Refill    fluoxetine (PROZAC) 40 MG capsule TAKE 1 CAPSULE BY MOUTH EVERY DAY 90  Capsule 1    losartan-hydrochlorothiazide (HYZAAR) 50-12.5 MG per tablet TAKE TWO TABLETS BY MOUTH DAILY 180 Tablet 1    albuterol 108 (90 Base) MCG/ACT Aero Soln inhalation aerosol Inhale 2 Puffs every four hours as needed for Shortness of Breath. 1 Each 0    Aspirin-Acetaminophen-Caffeine (EXCEDRIN PO) Take  by mouth.       No current facility-administered medications for this visit.       MEDICAL HISTORY  Past Medical History:   Diagnosis Date    Chickenpox     Head ache     Seen by marriage guidance counselor 02/24/2022     Past Surgical History:   Procedure Laterality Date    APPENDECTOMY      TESTICLE EXPLORATION      undecended testicle       PAST PSYCHIATRIC HISTORY  Prior psychiatric hospitalization: no  Prior Self harm/suicide attempt: no  Prior Diagnosis: Depression, ADHD     PAST PSYCHIATRIC MEDICATIONS  Prozac, Lexapro  Ritalin      FAMILY HISTORY  Psychiatric diagnosis:  no  History of suicide attempts:  no  Substance abuse history:  no     SUBSTANCE USE HISTORY:  History of excessive alcohol abuse with blackout in his 20s  Reports occasional alcohol use now and denies any drug abuse     SOCIAL HISTORY  Born in [place]  Currently in [facility]  Working for [employer]   with no kids  Describes poor relationship with father      REVIEW OF SYSTEMS:        Constitutional negative   Eyes negative   Ears/Nose/Mouth/Throat negative   Cardiovascular negative   Respiratory negative   Gastrointestinal negative   Genitourinary  DEBORAH on CPAP   Muscular negative   Integumentary negative   Neurological negative   Endocrine negative   Hematologic/Lymphatic negative     PHYSICAL EXAMINAION:  Vital signs: There were no vitals taken for this visit.  Musculoskeletal: Normal gait.   Abnormal movements: none      MENTAL STATUS EXAMINATION      General:   - Grooming and hygiene: Casual,   - Apparent distress: none,   - Behavior: Calm  - Eye Contact:  Good,   - no psychomotor agitation or retardation    - Participation:  Active verbal participation  Orientation: Alert and Fully Oriented to person, place and time  Mood: Euthymic  Affect: Flexible and Full range,  Thought Process: Logical and Goal-directed  Thought Content: Denies suicidal or homicidal ideations, intent or plan   Perception: Denies auditory or visual hallucinations. No delusions noted   Attention span and concentration: Intact   Speech:Rate within normal limits and Volume within normal limits  Language: Appropriate   Insight: Good  Judgment: Good  Recent and remote memory: No gross evidence of memory deficits        DEPRESSION SCREENIN/10/2022     4:30 PM 2022     4:30 PM 2023     7:00 AM   Depression Screen (PHQ-2/PHQ-9)   PHQ-2 Total Score 2 2 0   PHQ-9 Total Score 9 9        Interpretation of PHQ-9 Total Score   Score Severity   1-4 No Depression   5-9 Mild Depression   10-14 Moderate Depression   15-19 Moderately Severe Depression   20-27 Severe Depression    CURRENT RISK:       Suicidal: Low       Homicidal: Low       Self-Harm: Low       Relapse: Low       Crisis Safety Plan Reviewed Not Indicated       If evidence of imminent risk is present, intervention/plan:      MEDICAL RECORDS/LABS/DIAGNOSTIC TESTS REVIEWED:  No new lab since last visit     [place]  records -   Reviewed     PLAN:  (1) MDD; (2) AVERY  Stable  Continue Prozac 40 mg daily for mood and anxiety management.    Continue psychotherapy for mood and anxiety management.  Medication options, alternatives (including no medications) and medication risks/benefits/side effects were discussed in detail.  Explained importance of contraceptive measures while on psychotropic medications, educated to let provider know if ever pregnant or wanting to become pregnant. Verbalized understanding.  The patient was advised to call, message provider on MyChart, or come in to the clinic if symptoms worsen or if any future questions/issues regarding their medications arise; the patient verbalized  understanding and agreement.    The patient was educated to call 911, call the suicide hotline, or go to local ER if having thoughts of suicide or homicide; verbalized understanding.    Psychological Testing (6/5/23):  Based on the retrospective analysis, intake, review of self-report measures, and the behavioral observations and results of testing, patient does NOT meet criteria for adult ADHD.  Overall, patient agreed that a mood based hypothesis is a better fit for their symptoms than the ADHD hypothesis.  Diagnostic Impression: MDD, AVERY    Billing Coding based on:  13919 based on MDM    Return to clinic in 3 months or sooner if symptoms worsen.  Next Appointment: instruction provided on how to make the next appointment.     The proposed treatment plan was discussed with the patient who was provided the opportunity to ask questions and make suggestions regarding alternative treatment. Patient verbalized understanding and expressed agreement with the plan.       Buddy Bernrad M.D.  06/14/23    This note was created using voice recognition software (Dragon). The accuracy of the dictation is limited by the abilities of the software. I have reviewed the note prior to signing, however some errors in grammar and context are still possible. If you have any questions related to this note please do not hesitate to contact our office.

## 2023-07-20 ENCOUNTER — TELEMEDICINE (OUTPATIENT)
Dept: BEHAVIORAL HEALTH | Facility: CLINIC | Age: 33
End: 2023-07-20
Payer: COMMERCIAL

## 2023-07-20 DIAGNOSIS — F33.42 RECURRENT MAJOR DEPRESSIVE DISORDER, IN FULL REMISSION (HCC): ICD-10-CM

## 2023-07-20 DIAGNOSIS — F41.1 GAD (GENERALIZED ANXIETY DISORDER): ICD-10-CM

## 2023-07-20 PROCEDURE — 90837 PSYTX W PT 60 MINUTES: CPT | Mod: GT | Performed by: MARRIAGE & FAMILY THERAPIST

## 2023-07-21 NOTE — PROGRESS NOTES
[facility]   Therapy Progress Note      This visit was conducted via Zoom using secure and encrypted videoconferencing technology.  The patient was in a private location in the [facility].  The patient's identity was confirmed and verbal consent was obtained for this virtual visit.  Place of Service: POS 10 -The patient is at Home during their visit           Name: You Sumner  MRN: [id number]  : 1990  Age: 32 y.o.  Date of assessment: 2023  PCP: VIOLETA Mendoza  Persons in attendance: Patient  Total session time: 55 minutes    Objective Observations:   Participation:Active verbal participation, Attentive, and Engaged   Grooming:Casual   Cognition:Fully Oriented and Drowsy/Somnolent   Eye Contact:Good   Mood:Euthymic   Affect:Flexible, Full range, and Congruent with content   Thought Process:Logical and Goal-directed   Speech:Rate within normal limits and Volume within normal limits    Current Risk:   Suicide: low   Homicide: low   Self-Harm: low   Relapse: low   Safety Plan Reviewed: not applicable    Topics addressed in psychotherapy include: Patient changed to virtual due to being very tired. He discussed feeling tired from working out in the swimming pool recently. Patient reported that the  (well liked) was recent terminated by the  of operations. 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 This leads to a stressful situation. The patient discussed that his wife is privy to management information.   Vacationed at [facility] in [place].     Care Plan Updated: No    Does patient express agreement with the above plan? Yes     Diagnosis:  1. Recurrent major depressive disorder, in full remission (HCC)    2. AVERY (generalized anxiety disorder)        Referral appointment(s) scheduled? No       GOLDEN Dickson

## 2023-08-03 NOTE — PROGRESS NOTES
"Subjective:     CC: Nodule (Pt states developing a red swollen bump on his R shoulder after a sun burn x 1 month ago )      HPI:   You is a 32 y.o. male who presents today for:    Lump on right shoulder: he reports getting a sun burn about 1 month ago. He developed a lump on his arm shortly after that. It became red, swollen and he was able to \"pop it\" but it has not gone away.  He states that it is tender to palpation.  There is a white head on the top of it, which she leads me to suspect that it is a ingrown hair or sebaceous cyst    Obesity: he is wanting to lose weight. He tries to monitor his diet. He has not seen a dietitian. He lacks motivation to cook meals at the end of the day and resorts to processed foods.  He states that when he was younger and trying to get into the  he used phentermine for short period of time and was able to lose about 50 pounds.  He feels that this is a Band-Aid, and does not want to resume taking this medication at this time, as he is worried about becoming addicted to the medication.  We discussed referral to bariatric surgery.  Patient does not want to proceed with this at this time.  He is open to seeing a nutritionist, as well as increasing his activity.       Allergies: Patient has no known allergies.     Medications:   Current Outpatient Medications:     fluoxetine (PROZAC) 40 MG capsule, Take 1 Capsule by mouth every day., Disp: 90 Capsule, Rfl: 1    losartan-hydrochlorothiazide (HYZAAR) 50-12.5 MG per tablet, TAKE TWO TABLETS BY MOUTH DAILY, Disp: 180 Tablet, Rfl: 1    albuterol 108 (90 Base) MCG/ACT Aero Soln inhalation aerosol, Inhale 2 Puffs every four hours as needed for Shortness of Breath., Disp: 1 Each, Rfl: 0    Aspirin-Acetaminophen-Caffeine (EXCEDRIN PO), Take  by mouth., Disp: , Rfl:       ROS:  Review of Systems   Constitutional:  Negative for chills and fever.   Respiratory:  Negative for cough and shortness of breath.    Cardiovascular:  " "Negative for chest pain, palpitations, orthopnea and leg swelling.       Objective:     Exam:  /78 (BP Location: Left arm, Patient Position: Sitting, BP Cuff Size: Adult)   Pulse 63   Temp 36.6 °C (97.8 °F) (Temporal)   Ht 1.93 m (6' 4\")   Wt (!) 150 kg (331 lb)   SpO2 95%   BMI 40.29 kg/m²  Body mass index is 40.29 kg/m².    Physical Exam  Constitutional:       Appearance: He is obese.   Eyes:      Pupils: Pupils are equal, round, and reactive to light.   Cardiovascular:      Rate and Rhythm: Normal rate and regular rhythm.      Pulses: Normal pulses.      Heart sounds: Normal heart sounds.   Pulmonary:      Effort: Pulmonary effort is normal.      Breath sounds: Normal breath sounds.   Neurological:      Mental Status: He is alert and oriented to person, place, and time.   Psychiatric:         Mood and Affect: Mood normal.         Behavior: Behavior normal.           Assessment & Plan:     You nelson 32 y.o. male with the following -     1. Ingrown hair  Acute, uncomplicated  Discussed with patient that he most likely has an ingrown hair, or potentially a sebaceous cyst on his shoulder.  Does not appear to be infected at this time.  Discussed with patient putting hot compress on it to help it come to head to rupture on its own.  We discussed potentially needing referral to dermatology if it continues to recur, and is overly bothersome to him if he would like to get it removed.    2. Morbid obesity with BMI of 40.0-44.9, adult (HCC)  Chronic, unstable  He reports wanting to lose weight, and struggling to do so.  He finds that he lacks motivation at the end of the day to cook nutritious meals.  He has tried to meal prep, this does help some.  But he finds himself resorting to quick processed meals more frequently than not.  He does not drink much soda, or alcohol, or coffee.  Patient declined referral to bariatric surgery and is not interested in medication management at this time.  We discussed " increasing his activity and continue to work on his diet to help promote further weight loss.  - Referral to Nutrition Services        Anticipatory guidance included the following: Patient counseled about skin care, diet, supplements, smoking, drugs/alcohol use, safe sex and exercise.     Return in about 3 months (around 11/4/2023), or if symptoms worsen or fail to improve.    Please note that this dictation was created using voice recognition software. I have made every reasonable attempt to correct obvious errors, but I expect that there are errors of grammar and possibly content that I did not discover before finalizing the note.

## 2023-08-04 ENCOUNTER — OFFICE VISIT (OUTPATIENT)
Dept: MEDICAL GROUP | Facility: MEDICAL CENTER | Age: 33
End: 2023-08-04
Payer: COMMERCIAL

## 2023-08-04 VITALS
TEMPERATURE: 97.8 F | BODY MASS INDEX: 38.36 KG/M2 | HEART RATE: 63 BPM | WEIGHT: 315 LBS | SYSTOLIC BLOOD PRESSURE: 116 MMHG | HEIGHT: 76 IN | DIASTOLIC BLOOD PRESSURE: 78 MMHG | OXYGEN SATURATION: 95 %

## 2023-08-04 DIAGNOSIS — E66.01 MORBID OBESITY WITH BMI OF 40.0-44.9, ADULT (HCC): ICD-10-CM

## 2023-08-04 DIAGNOSIS — L73.1 INGROWN HAIR: ICD-10-CM

## 2023-08-04 PROCEDURE — 3074F SYST BP LT 130 MM HG: CPT

## 2023-08-04 PROCEDURE — 3078F DIAST BP <80 MM HG: CPT

## 2023-08-04 PROCEDURE — 99214 OFFICE O/P EST MOD 30 MIN: CPT

## 2023-08-04 ASSESSMENT — ENCOUNTER SYMPTOMS
COUGH: 0
CHILLS: 0
SHORTNESS OF BREATH: 0
FEVER: 0
ORTHOPNEA: 0
PALPITATIONS: 0

## 2023-08-04 ASSESSMENT — FIBROSIS 4 INDEX: FIB4 SCORE: 0.54

## 2023-08-31 ENCOUNTER — OFFICE VISIT (OUTPATIENT)
Dept: BEHAVIORAL HEALTH | Facility: CLINIC | Age: 33
End: 2023-08-31
Payer: COMMERCIAL

## 2023-08-31 DIAGNOSIS — F33.1 MODERATE EPISODE OF RECURRENT MAJOR DEPRESSIVE DISORDER (HCC): ICD-10-CM

## 2023-08-31 DIAGNOSIS — F41.1 GAD (GENERALIZED ANXIETY DISORDER): ICD-10-CM

## 2023-08-31 PROCEDURE — 90837 PSYTX W PT 60 MINUTES: CPT | Performed by: MARRIAGE & FAMILY THERAPIST

## 2023-09-01 NOTE — PROGRESS NOTES
[facility]   Therapy Progress Note        Name: You Sumner  MRN: [id number]  : 1990  Age: 32 y.o.  Date of assessment: 2023  PCP: VIOLETA Mendoza  Persons in attendance: Patient  Total session time: 55 minutes      Topics addressed in psychotherapy include: Met with the patient in office for an individual counseling session.  The patient reported that recently he is struggling with his calculus class.  He has taken calculus before and did not pass the class.  The patient discussed his interest and desires and becoming an  because he likes figuring how things work.  He reports that he in his ADHD testing it showed that he would follow in understand concepts to a point where he could not remember.  Discussed with the patient that this clinician will follow up with the psychologist for recommendations.  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  Worked with the patient on validating his experiences.  This dictation has been created using voice recognition software and/or scribes. The accuracy of the dictation is limited by the abilities of the software and the expertise of the scribes. I expect there may be some errors of grammar and possibly content. I made every attempt to manually correct the errors within my dictation. However, errors related to voice recognition software and/or scribes may still exist and should be interpreted within the appropriate context.    Objective Observations:   Participation:Active verbal participation, Attentive, and Engaged   Grooming:Casual   Cognition:Alert and Fully Oriented   Eye Contact:Good   Mood:Depressed   Affect:Flexible, Full range, Congruent with content, Sad, Anxious, and Tearful   Thought Process:Logical and Goal-directed   Speech:Rate within normal limits and Volume within normal limits    Current Risk:   Suicide: low   Homicide:  low   Self-Harm: low   Relapse: low   Safety Plan Reviewed: not applicable    Care Plan Updated: No    Does patient express agreement with the above plan? Yes     Diagnosis:  1. Moderate episode of recurrent major depressive disorder (HCC)    2. AVERY (generalized anxiety disorder)        Referral appointment(s) scheduled? No       BARRINGTON Dickson.

## 2023-09-14 RX ORDER — LOSARTAN POTASSIUM AND HYDROCHLOROTHIAZIDE 12.5; 5 MG/1; MG/1
TABLET ORAL
Qty: 180 TABLET | Refills: 3 | Status: SHIPPED | OUTPATIENT
Start: 2023-09-14

## 2023-09-14 NOTE — TELEPHONE ENCOUNTER
Received request via: Pharmacy    Was the patient seen in the last year in this department? Yes  LOV : 8/4/2023   Does the patient have an active prescription (recently filled or refills available) for medication(s) requested? No    Does the patient have assisted Plus and need 100 day supply (blood pressure, diabetes and cholesterol meds only)? Patient does not have SCP

## 2023-09-21 ENCOUNTER — OFFICE VISIT (OUTPATIENT)
Dept: BEHAVIORAL HEALTH | Facility: CLINIC | Age: 33
End: 2023-09-21
Payer: COMMERCIAL

## 2023-09-21 DIAGNOSIS — F33.1 MODERATE EPISODE OF RECURRENT MAJOR DEPRESSIVE DISORDER (HCC): ICD-10-CM

## 2023-09-21 DIAGNOSIS — F41.1 GAD (GENERALIZED ANXIETY DISORDER): ICD-10-CM

## 2023-09-21 PROCEDURE — 90837 PSYTX W PT 60 MINUTES: CPT | Performed by: MARRIAGE & FAMILY THERAPIST

## 2023-09-22 ASSESSMENT — ANXIETY QUESTIONNAIRES
IF YOU CHECKED OFF ANY PROBLEMS ON THIS QUESTIONNAIRE, HOW DIFFICULT HAVE THESE PROBLEMS MADE IT FOR YOU TO DO YOUR WORK, TAKE CARE OF THINGS AT HOME, OR GET ALONG WITH OTHER PEOPLE: SOMEWHAT DIFFICULT
3. WORRYING TOO MUCH ABOUT DIFFERENT THINGS: NOT AT ALL
1. FEELING NERVOUS, ANXIOUS, OR ON EDGE: SEVERAL DAYS
2. NOT BEING ABLE TO STOP OR CONTROL WORRYING: SEVERAL DAYS
7. FEELING AFRAID AS IF SOMETHING AWFUL MIGHT HAPPEN: NOT AT ALL
5. BEING SO RESTLESS THAT IT IS HARD TO SIT STILL: NOT AT ALL
4. TROUBLE RELAXING: SEVERAL DAYS
6. BECOMING EASILY ANNOYED OR IRRITABLE: SEVERAL DAYS
GAD7 TOTAL SCORE: 4

## 2023-09-22 ASSESSMENT — PATIENT HEALTH QUESTIONNAIRE - PHQ9
5. POOR APPETITE OR OVEREATING: 0 - NOT AT ALL
SUM OF ALL RESPONSES TO PHQ QUESTIONS 1-9: 4
CLINICAL INTERPRETATION OF PHQ2 SCORE: 2

## 2023-09-22 NOTE — PROGRESS NOTES
[facility]   Therapy Progress Note        Name: You Sumner  MRN: [id number]  : 1990  Age: 32 y.o.  Date of assessment: 2023  PCP: VIOLETA Mendoza  Persons in attendance: Patient  Total session time: 55   minutes      Topics addressed in psychotherapy include: Met with the patient in office for an individual counseling session.  The patient appears to have an adequate response to treatment in managing and maintaining moods.  His symptoms, as noted, appear to be maintaining and manageable.    Behavior:  Patient appears to display adequate self-care and appears to be open and honest in session.  His thought processes are clear, logical, and tangential at times.     Content of Therapy:   The patient discussed doing much better on a calculus quiz however having questionable feelings regarding a recent test.  Explored with the patient his all or nothing thinking as related to a calculus and other endeavors in life.  Patients stated, “any time adults did something wrong I got blamed for it”.  We did not have time to process that statement in session.  Discussed his childhood accomplishments and pitfalls.    Therapeutic Intervention:   This session, the therapeutic focus, was on supporting the patient in building self esteem while refraining from negative self talk.  Challenged the patient to confront the cognitive distortions of all or nothing thinking patient reflected on past successes, goals, and challenges to overcome.    This dictation has been created using voice recognition software and/or scribes. The accuracy of the dictation is limited by the abilities of the software and the expertise of the scribes. I expect there may be some errors of grammar and possibly content. I made every attempt to manually correct the errors within my dictation. However, errors related to voice recognition software and/or scribes may still exist and should be interpreted within the  appropriate context.       Participation:Active verbal participation, Attentive, and Engaged   Grooming:Casual   Cognition:Alert and Fully Oriented   Eye Contact:Good   Mood:Depressed and Anxious   Affect:Flexible, Full range, and Congruent with content   Thought Process:Logical, Goal-directed, and Tangential   Speech:Rate within normal limits and Volume within normal limits    Current Risk:   Suicide: low   Homicide: low   Self-Harm: low   Relapse: low   Safety Plan Reviewed: not applicable    Care Plan Updated: No    Does patient express agreement with the above plan? Yes     Diagnosis:  1. AVERY (generalized anxiety disorder)    2. Moderate episode of recurrent major depressive disorder (HCC)        Referral appointment(s) scheduled? No       GOLDEN Dickson

## 2023-10-13 ENCOUNTER — OFFICE VISIT (OUTPATIENT)
Dept: SLEEP MEDICINE | Facility: MEDICAL CENTER | Age: 33
End: 2023-10-13
Attending: NURSE PRACTITIONER
Payer: COMMERCIAL

## 2023-10-13 VITALS
BODY MASS INDEX: 38.36 KG/M2 | WEIGHT: 315 LBS | OXYGEN SATURATION: 96 % | DIASTOLIC BLOOD PRESSURE: 82 MMHG | RESPIRATION RATE: 16 BRPM | HEART RATE: 85 BPM | HEIGHT: 76 IN | SYSTOLIC BLOOD PRESSURE: 130 MMHG

## 2023-10-13 DIAGNOSIS — G47.33 OSA (OBSTRUCTIVE SLEEP APNEA): ICD-10-CM

## 2023-10-13 PROCEDURE — 3079F DIAST BP 80-89 MM HG: CPT | Performed by: NURSE PRACTITIONER

## 2023-10-13 PROCEDURE — 3075F SYST BP GE 130 - 139MM HG: CPT | Performed by: NURSE PRACTITIONER

## 2023-10-13 PROCEDURE — 99213 OFFICE O/P EST LOW 20 MIN: CPT | Performed by: NURSE PRACTITIONER

## 2023-10-13 PROCEDURE — 99211 OFF/OP EST MAY X REQ PHY/QHP: CPT | Performed by: NURSE PRACTITIONER

## 2023-10-13 ASSESSMENT — FIBROSIS 4 INDEX: FIB4 SCORE: 0.54

## 2023-10-13 NOTE — PROGRESS NOTES
Chief Complaint   Patient presents with    Follow-Up     [facility] DEBORAH ANNUAL VISIT    AUTO CPAP 5 TO15CM/ HZO       HPI:  You Sumner is a 32 y.o. year old male here today for follow-up on SA annual visit.  Last seen by me on 12/6/2022 for first compliance visit.  He on auto CPAP 5 to 15 cm/H2O. Home sleep study showed evidence of severe DEBORAH with an MARC of 33.4 and an O2 jyoti of 68%.  Patient was below 88% for 40 minutes of total evaluation time.     Currently using an over the nose nasal mask and denies any difficulty with mask fit or pressures.  He sleeps 6 to 7 hours nightly and does have occasional headaches due to sinus problems, but denies any morning headaches, palpitations, concentration or memory problems.  He does note significant improvement in his energy levels.  He denies any excessive daytime sleepiness.  He used to have to drink several cans of Monster energy drinks to stay awake during the day and now he is only using drinking 1 about every 6 days.     30-day compliance reviewed with patient shows 100% use with an average time of 6 hours and 35 minutes with an AHI of 0.5 with no evidence of mask leak.    ROS: As per HPI and otherwise negative if not stated.    Past Medical History:   Diagnosis Date    Chickenpox     Head ache     Moderate episode of recurrent major depressive disorder (HCC) 5/13/2021    Seen by marriage guidance counselor 02/24/2022       Past Surgical History:   Procedure Laterality Date    APPENDECTOMY      TESTICLE EXPLORATION      undecended testicle       Family History   Problem Relation Age of Onset    Sleep Apnea Mother     Sleep Apnea Father     Hyperlipidemia Father     Hypertension Father     Heart Disease Father     No Known Problems Sister     No Known Problems Sister     No Known Problems Brother        Allergies as of 10/13/2023    (No Known Allergies)        Vitals:  /82 (BP Location: Left arm, Patient Position: Sitting, BP Cuff Size: Large adult)    "Pulse 85   Resp 16   Ht 1.93 m (6' 4\")   Wt (!) 144 kg (317 lb 1.6 oz)   SpO2 96%     Current medications as of today   Current Outpatient Medications   Medication Sig Dispense Refill    losartan-hydrochlorothiazide (HYZAAR) 50-12.5 MG per tablet TAKE 2 TABLETS BY MOUTH DAILY 180 Tablet 3    fluoxetine (PROZAC) 40 MG capsule Take 1 Capsule by mouth every day. 90 Capsule 1    albuterol 108 (90 Base) MCG/ACT Aero Soln inhalation aerosol Inhale 2 Puffs every four hours as needed for Shortness of Breath. 1 Each 0    Aspirin-Acetaminophen-Caffeine (EXCEDRIN PO) Take  by mouth.       No current facility-administered medications for this visit.         Physical Exam:   Gen:           Alert and oriented, No apparent distress. Mood and affect appropriate, normal interaction with examiner.  Eyes:          PERRL, EOM intact, sclere white, conjunctive moist.  Ears:          Not examined.   Hearing:     Grossly intact.  Nose:          Normal, no lesions or deformities.  Dentition:    Good dentition.  Oropharynx:   Tongue normal, posterior pharynx without erythema or exudate.  Neck:        Supple, trachea midline, no masses.  Respiratory Effort: No intercostal retractions or use of accessory muscles.   Lung Auscultation:      Clear to auscultation bilaterally; no rales, rhonchi or wheezing.  CV:            Regular rate and rhythm. No murmurs, rubs or gallops.  Abd:           Not examined.   Lymphadenopathy: Not examined.  Gait and Station: Normal.  Digits and Nails: No clubbing, cyanosis, petechiae, or nodes.   Cranial Nerves: II-XII grossly intact.  Skin:        No rashes, lesions or ulcers noted.               Ext:           No cyanosis or edema.      Assessment:  1. DEBORAH (obstructive sleep apnea)  DME Mask and Supplies          Plan:  Continues to use and benefit from CPAP therapy.  Compliance shows adequate use and control of DEBORAH.  Order placed for mask and supplies which will be good for 1 year.  Advise annual follow-up, " but can be seen sooner if needed.    Please note that this dictation was created using voice recognition software. I have made every reasonable attempt to correct obvious errors, but it is possible there are errors of grammar and possibly content that I did not discover before finalizing the note.

## 2023-12-05 ENCOUNTER — TELEMEDICINE (OUTPATIENT)
Dept: BEHAVIORAL HEALTH | Facility: CLINIC | Age: 33
End: 2023-12-05
Payer: COMMERCIAL

## 2023-12-05 DIAGNOSIS — F33.1 MODERATE EPISODE OF RECURRENT MAJOR DEPRESSIVE DISORDER (HCC): ICD-10-CM

## 2023-12-05 DIAGNOSIS — F41.1 GAD (GENERALIZED ANXIETY DISORDER): ICD-10-CM

## 2023-12-05 PROCEDURE — 90837 PSYTX W PT 60 MINUTES: CPT | Mod: 95 | Performed by: MARRIAGE & FAMILY THERAPIST

## 2023-12-06 NOTE — PROGRESS NOTES
[facility]   Therapy Progress Note      This visit was conducted via Zoom using secure and encrypted videoconferencing technology.  The patient was in a private location in the state of [place].  The patient's identity was confirmed and verbal consent was obtained for this virtual visit.  Place of Service: POS 10 -The patient is at Home during their visit           Name: You Sumner  MRN: [id number]  : 1990  Age: 33 y.o.  Date of assessment: 2023  PCP: VIOLETA Mendoza  Persons in attendance: Patient  Total session time: 55 minutes    Objective Observations:   Participation:Active verbal participation   Grooming:Casual   Cognition:Alert and Fully Oriented   Eye Contact:Good   Mood:Euthymic   Affect:Flexible and Full range   Thought Process:Logical and Goal-directed   Speech:Rate within normal limits and Volume within normal limits    Current Risk:   Suicide: low   Homicide: low   Self-Harm: low   Relapse: low   Safety Plan Reviewed: not applicable    Topics addressed in psychotherapy include: Met with the patient via zoom for an individual counseling session.      Behavior:  The patient appeared honest and open during session.    Content of Therapy:   Patient reports that he has a B in calculus. Both he and his wife have finals next week, so it is a stressful time. Patient discussed his Father in Law family relationship affects him . Discussed doing better in Calculus and wanting to  in the future    Therapeutic Intervention:   This session, the therapeutic focus, was on supporting the patient has he has made significant gains in his schoolwork this year.  Worked with the patient on uncovering his feelings and insecurities relating to his father in laws family relationships. Discussed anxiety. Looking forward to winter break.        This dictation has been created using voice recognition software and/or scribes. The accuracy of the dictation is limited by the  abilities of the software and the expertise of the scribes. I expect there may be some errors of grammar and possibly content. I made every attempt to manually correct the errors within my dictation. However, errors related to voice recognition software and/or scribes may still exist and should be interpreted within the appropriate context.    Care Plan Updated: No    Does patient express agreement with the above plan? Yes     Diagnosis:  1. Moderate episode of recurrent major depressive disorder (HCC)    2. AVERY (generalized anxiety disorder)        Referral appointment(s) scheduled? No       BARRINGTON Dickson.

## 2023-12-18 DIAGNOSIS — F33.42 RECURRENT MAJOR DEPRESSIVE DISORDER, IN FULL REMISSION (HCC): ICD-10-CM

## 2023-12-18 DIAGNOSIS — F41.1 GAD (GENERALIZED ANXIETY DISORDER): ICD-10-CM

## 2023-12-18 RX ORDER — FLUOXETINE HYDROCHLORIDE 40 MG/1
40 CAPSULE ORAL DAILY
Qty: 30 CAPSULE | Refills: 0 | Status: SHIPPED | OUTPATIENT
Start: 2023-12-18 | End: 2023-12-22 | Stop reason: SDUPTHER

## 2023-12-22 ENCOUNTER — OFFICE VISIT (OUTPATIENT)
Dept: BEHAVIORAL HEALTH | Facility: CLINIC | Age: 33
End: 2023-12-22
Payer: COMMERCIAL

## 2023-12-22 DIAGNOSIS — F33.1 MODERATE EPISODE OF RECURRENT MAJOR DEPRESSIVE DISORDER (HCC): ICD-10-CM

## 2023-12-22 DIAGNOSIS — F33.42 RECURRENT MAJOR DEPRESSIVE DISORDER, IN FULL REMISSION (HCC): ICD-10-CM

## 2023-12-22 DIAGNOSIS — F41.1 GAD (GENERALIZED ANXIETY DISORDER): ICD-10-CM

## 2023-12-22 PROCEDURE — 99214 OFFICE O/P EST MOD 30 MIN: CPT | Performed by: PSYCHIATRY & NEUROLOGY

## 2023-12-22 RX ORDER — BUPROPION HYDROCHLORIDE 150 MG/1
150 TABLET ORAL EVERY MORNING
Qty: 90 TABLET | Refills: 1 | Status: SHIPPED | OUTPATIENT
Start: 2023-12-22

## 2023-12-22 RX ORDER — FLUOXETINE HYDROCHLORIDE 40 MG/1
40 CAPSULE ORAL DAILY
Qty: 90 CAPSULE | Refills: 1 | Status: SHIPPED | OUTPATIENT
Start: 2023-12-22

## 2023-12-22 NOTE — PROGRESS NOTES
PSYCHIATRY FOLLOW-UP NOTE      Name: You Sumner  MRN: [id number]  : 1990  Age: 33 y.o.  Date of assessment: 2023  PCP: VIOLETA Mendoza  Persons in attendance: Patient      REASON FOR VISIT/CHIEF COMPLAINT (as stated by Patient):  You Sumner is a 33 y.o., White male, attending follow-up appointment for mood and anxiety management.      HISTORY OF PRESENT ILLNESS:  You Sumner is a 33 y.o. old male with MDD and AVERY comes in today for follow up. Patient was last seen 6 months ago, and following treatment planning recommendations were done:  Continue Prozac 40 mg daily for mood and anxiety management.    Continue psychotherapy for mood and anxiety management.        Patient is compliant with Prozac 40 mg.  Describes mood and anxiety as stable but reports occasional irritability mostly after work when around wife.  They are currently doing counseling.  Discussed the likely reasons for irritability but not meeting criteria for anxiety or depression contributing to that.  Note triggers causing heightened emotional reactivity.  Patient struggling with low energy, tiredness and needing him to drink more energy drinks.  He is also struggling with low sexual drive after adding Prozac but has seen benefit from Prozac on mood and anxiety.  Agreed with adding Wellbutrin to help with low energy, poor focus and sexual side effects from Prozac.  Educated patient to monitor for negative side effects including sleep disruption, worsening irritability and anxiety.      CURRENT MEDICATIONS:  Current Outpatient Medications   Medication Sig Dispense Refill    fluoxetine (PROZAC) 40 MG capsule Take 1 Capsule by mouth every day. 30 Capsule 0    losartan-hydrochlorothiazide (HYZAAR) 50-12.5 MG per tablet TAKE 2 TABLETS BY MOUTH DAILY 180 Tablet 3    albuterol 108 (90 Base) MCG/ACT Aero Soln inhalation aerosol Inhale 2 Puffs every four hours as needed for Shortness of  Breath. 1 Each 0    Aspirin-Acetaminophen-Caffeine (EXCEDRIN PO) Take  by mouth.       No current facility-administered medications for this visit.       MEDICAL HISTORY  Past Medical History:   Diagnosis Date    Chickenpox     Head ache     Moderate episode of recurrent major depressive disorder (HCC) 2021    Seen by marriage guidance counselor 2022     Past Surgical History:   Procedure Laterality Date    APPENDECTOMY      TESTICLE EXPLORATION      undecended testicle       PAST PSYCHIATRIC MEDICATIONS  Prozac, Lexapro  Ritalin        REVIEW OF SYSTEMS:        Constitutional negative   Eyes negative   Ears/Nose/Mouth/Throat negative   Cardiovascular negative   Respiratory negative   Gastrointestinal negative   Genitourinary  DEBORAH on CPAP   Muscular negative   Integumentary negative   Neurological negative   Endocrine negative   Hematologic/Lymphatic negative     PHYSICAL EXAMINAION:  Vital signs: There were no vitals taken for this visit.  Musculoskeletal: Normal gait.   Abnormal movements: none      MENTAL STATUS EXAMINATION      General:   - Grooming and hygiene: Casual,   - Apparent distress: none,   - Behavior: Calm  - Eye Contact:  Good,   - no psychomotor agitation or retardation    - Participation: Active verbal participation  Orientation: Alert and Fully Oriented to person, place and time  Mood: Euthymic  Affect: Flexible,  Thought Process: Logical and Goal-directed  Thought Content: Denies suicidal or homicidal ideations, intent or plan   Perception: Denies auditory or visual hallucinations. No delusions noted   Attention span and concentration: Intact   Speech:Rate within normal limits and Volume within normal limits  Language: Appropriate   Insight: Good  Judgment: Good  Recent and remote memory: No gross evidence of memory deficits        DEPRESSION SCREENIN/23/2022     4:30 PM 2023     7:00 AM 2023     5:00 PM   Depression Screen (PHQ-2/PHQ-9)   PHQ-2 Total Score 2 0 2    PHQ-9 Total Score 9  4       Interpretation of PHQ-9 Total Score   Score Severity   1-4 No Depression   5-9 Mild Depression   10-14 Moderate Depression   15-19 Moderately Severe Depression   20-27 Severe Depression    CURRENT RISK:       Suicidal: Low       Homicidal: Low       Self-Harm: Low       Relapse: Low       Crisis Safety Plan Reviewed Not Indicated       If evidence of imminent risk is present, intervention/plan:      MEDICAL RECORDS/LABS/DIAGNOSTIC TESTS REVIEWED:  No new lab since last visit     [place]  records -   Reviewed     PLAN:  (1) MDD; (2) AVERY  Low energy/tiredness, inattention, irritability and sexual side effects  Continue Prozac 40 mg daily for mood and anxiety management.    Add Wellbutrin  mg daily X 3 weeks --> if no improvement seen, increase to 300 mg daily for mood and focus management.   Continue psychotherapy for mood and anxiety management.  Medication options, alternatives (including no medications) and medication risks/benefits/side effects were discussed in detail.  Explained importance of contraceptive measures while on psychotropic medications, educated to let provider know if ever pregnant or wanting to become pregnant. Verbalized understanding.  The patient was advised to call, message provider on [employer], or come in to the clinic if symptoms worsen or if any future questions/issues regarding their medications arise; the patient verbalized understanding and agreement.    The patient was educated to call 911, call the suicide hotline, or go to local ER if having thoughts of suicide or homicide; verbalized understanding.     Psychological Testing (6/5/23):  Based on the retrospective analysis, intake, review of self-report measures, and the behavioral observations and results of testing, patient does NOT meet criteria for adult ADHD.  Overall, patient agreed that a mood based hypothesis is a better fit for their symptoms than the ADHD hypothesis.  Diagnostic Impression:  MDD, AVERY    Billing Coding based on:  59024 based on MDM    Return to clinic in 6 weeks or sooner if symptoms worsen.  Next Appointment: instruction provided on how to make the next appointment.     The proposed treatment plan was discussed with the patient who was provided the opportunity to ask questions and make suggestions regarding alternative treatment. Patient verbalized understanding and expressed agreement with the plan.       Buddy Bernard M.D.  12/22/23    This note was created using voice recognition software (Dragon). The accuracy of the dictation is limited by the abilities of the software. I have reviewed the note prior to signing, however some errors in grammar and context are still possible. If you have any questions related to this note please do not hesitate to contact our office.

## 2024-01-04 ENCOUNTER — TELEMEDICINE (OUTPATIENT)
Dept: BEHAVIORAL HEALTH | Facility: CLINIC | Age: 34
End: 2024-01-04
Payer: COMMERCIAL

## 2024-01-04 DIAGNOSIS — F33.1 MODERATE EPISODE OF RECURRENT MAJOR DEPRESSIVE DISORDER (HCC): ICD-10-CM

## 2024-01-04 DIAGNOSIS — F41.1 GAD (GENERALIZED ANXIETY DISORDER): ICD-10-CM

## 2024-01-04 PROCEDURE — 90837 PSYTX W PT 60 MINUTES: CPT | Mod: GT | Performed by: MARRIAGE & FAMILY THERAPIST

## 2024-01-05 NOTE — PROGRESS NOTES
[facility]   Therapy Progress Note      This visit was conducted via Zoom using secure and encrypted videoconferencing technology.  The patient was in a private location in the [facility].  The patient's identity was confirmed and verbal consent was obtained for this virtual visit.  Place of Service: POS 10 -The patient is at Home during their visit         Name: You Sumner  MRN: [id number]  : 1990  Age: 33 y.o.  Date of assessment: 2024  PCP: VIOLETA Mendoza  Persons in attendance: Patient  Total session time: 56 minutes    Objective Observations:   Participation:Active verbal participation, Attentive, and Engaged   Grooming:Casual   Cognition:Alert and Fully Oriented   Eye Contact:Good   Mood:Depressed   Affect:Flexible and Full range   Thought Process:Logical and Goal-directed   Speech:Rate within normal limits and Volume within normal limits    Current Risk:   Suicide: low   Homicide: low   Self-Harm: low   Relapse: low   Safety Plan Reviewed: not applicable    Topics addressed in psychotherapy include: Met with the patient via zoom for an individual counseling session.      Behavior:  The patient appeared honest and open during session.      Content of Therapy:   The patient discussed that he has significant financial worries around school.  He did pass his calculus class however due to a past low completion rate he may be ineligible for financial aid.  The patient discussed how he does poorly the online instruction.  Patient was also not feeling well and had taken the afternoon off from work.    Therapeutic Intervention:   This session, the therapeutic focus was on processing the patient's emotions and feelings about academic performance.  Worked with the patient understand in a marriage relationship that there is a certain give and take while he works and his wife completes her schooling.  Validated the patient's fears and concerns.    This dictation  has been created using voice recognition software and/or scribes. The accuracy of the dictation is limited by the abilities of the software and the expertise of the scribes. I expect there may be some errors of grammar and possibly content. I made every attempt to manually correct the errors within my dictation. However, errors related to voice recognition software and/or scribes may still exist and should be interpreted within the appropriate context.    Discussed finances.     Care Plan Updated: No    Does patient express agreement with the above plan? Yes     Diagnosis:  1. Moderate episode of recurrent major depressive disorder (HCC)    2. AVERY (generalized anxiety disorder)        Referral appointment(s) scheduled? No       BARRINGTON Dickson.

## 2024-01-17 ENCOUNTER — OFFICE VISIT (OUTPATIENT)
Dept: URGENT CARE | Facility: PHYSICIAN GROUP | Age: 34
End: 2024-01-17
Payer: COMMERCIAL

## 2024-01-17 VITALS
SYSTOLIC BLOOD PRESSURE: 142 MMHG | RESPIRATION RATE: 18 BRPM | TEMPERATURE: 97.8 F | OXYGEN SATURATION: 94 % | BODY MASS INDEX: 38.36 KG/M2 | HEART RATE: 74 BPM | WEIGHT: 315 LBS | HEIGHT: 76 IN | DIASTOLIC BLOOD PRESSURE: 80 MMHG

## 2024-01-17 DIAGNOSIS — I10 BENIGN ESSENTIAL HTN: Chronic | ICD-10-CM

## 2024-01-17 DIAGNOSIS — R05.1 ACUTE COUGH: ICD-10-CM

## 2024-01-17 DIAGNOSIS — J02.9 PHARYNGITIS, UNSPECIFIED ETIOLOGY: ICD-10-CM

## 2024-01-17 LAB
FLUAV RNA SPEC QL NAA+PROBE: NEGATIVE
FLUBV RNA SPEC QL NAA+PROBE: NEGATIVE
RSV RNA SPEC QL NAA+PROBE: NEGATIVE
S PYO DNA SPEC NAA+PROBE: NOT DETECTED
SARS-COV-2 RNA RESP QL NAA+PROBE: NEGATIVE

## 2024-01-17 PROCEDURE — 0241U POCT CEPHEID COV-2, FLU A/B, RSV - PCR: CPT

## 2024-01-17 PROCEDURE — 99213 OFFICE O/P EST LOW 20 MIN: CPT

## 2024-01-17 PROCEDURE — 87651 STREP A DNA AMP PROBE: CPT

## 2024-01-17 PROCEDURE — 3077F SYST BP >= 140 MM HG: CPT

## 2024-01-17 PROCEDURE — 3079F DIAST BP 80-89 MM HG: CPT

## 2024-01-17 ASSESSMENT — ENCOUNTER SYMPTOMS
SPUTUM PRODUCTION: 1
FEVER: 0
SORE THROAT: 1
MYALGIAS: 0
CHILLS: 0
COUGH: 1

## 2024-01-17 ASSESSMENT — FIBROSIS 4 INDEX: FIB4 SCORE: 0.56

## 2024-01-17 NOTE — LETTER
January 17, 2024    To Whom It May Concern:         This is confirmation that You Sumner attended his scheduled appointment with Zehra Fontaine P.A.-C. on 1/17/24. Please excuse his absence from work today. He may return to work in 1-3 days if feeling well.          If you have any questions please do not hesitate to call me at the phone number listed below.    Sincerely,          Zehra Fontaine P.A.-C.  [phone]

## 2024-01-17 NOTE — PROGRESS NOTES
Subjective:     CHIEF COMPLAINT  Chief Complaint   Patient presents with    Pharyngitis     cough       HPI  Christopher Johnny Sumner is a very pleasant 33 y.o. male who presents with a sore throat, pain upon swallowing/pain with mouth movement, bilateral ear pain, a mild cough productive of phlegm, and fatigue.  He has not had any fevers, body aches, or chills.  He reports he was sick with similar symptoms 1 week ago, but had full improvement of symptoms for approximately 4 days.  His symptoms returned yesterday.    REVIEW OF SYSTEMS  Review of Systems   Constitutional:  Positive for malaise/fatigue. Negative for chills and fever.   HENT:  Positive for congestion, ear pain and sore throat.    Respiratory:  Positive for cough and sputum production.    Musculoskeletal:  Negative for myalgias.       PAST MEDICAL HISTORY  Patient Active Problem List    Diagnosis Date Noted    Obstructive sleep apnea syndrome 04/13/2023    CPAP (continuous positive airway pressure) dependence 04/13/2023    Morbid obesity with BMI of 40.0-44.9, adult (HCC) 04/13/2023    AVERY (generalized anxiety disorder) 09/23/2022    Seen by marriage guidance counselor 02/24/2022    Benign essential HTN 05/27/2021    Moderate episode of recurrent major depressive disorder (HCC) 05/13/2021    Chronic tension-type headache, not intractable 05/13/2021       SURGICAL HISTORY   has a past surgical history that includes appendectomy and testicle exploration.    ALLERGIES  No Known Allergies    CURRENT MEDICATIONS  Home Medications       Reviewed by Zehra Fontaine P.A.-C. (Physician Assistant) on 01/17/24 at 0842  Med List Status: <None>     Medication Last Dose Status   albuterol 108 (90 Base) MCG/ACT Aero Soln inhalation aerosol PRN Active   Aspirin-Acetaminophen-Caffeine (EXCEDRIN PO) PRN Active   buPROPion (WELLBUTRIN XL) 150 MG XL tablet Taking Active   fluoxetine (PROZAC) 40 MG capsule Taking Active   losartan-hydrochlorothiazide (HYZAAR) 50-12.5  "MG per tablet Taking Active                    SOCIAL HISTORY  Social History     Tobacco Use    Smoking status: Former     Current packs/day: 0.00     Types: Cigarettes, Pipe     Quit date:      Years since quittin.0    Smokeless tobacco: Never   Vaping Use    Vaping Use: Never used   Substance and Sexual Activity    Alcohol use: Yes     Comment: occ    Drug use: No    Sexual activity: Not on file       FAMILY HISTORY  Family History   Problem Relation Age of Onset    Sleep Apnea Mother     Sleep Apnea Father     Hyperlipidemia Father     Hypertension Father     Heart Disease Father     No Known Problems Sister     No Known Problems Sister     No Known Problems Brother           Objective:     VITAL SIGNS: BP (!) 142/80   Pulse 74   Temp 36.6 °C (97.8 °F)   Resp 18   Ht 1.93 m (6' 4\")   Wt (!) 143 kg (315 lb)   SpO2 94%   BMI 38.34 kg/m²     PHYSICAL EXAM  Physical Exam  Vitals reviewed.   Constitutional:       General: He is not in acute distress.     Appearance: Normal appearance. He is not ill-appearing, toxic-appearing or diaphoretic.   HENT:      Head: Normocephalic and atraumatic.      Right Ear: Ear canal and external ear normal. A middle ear effusion is present. Tympanic membrane is not erythematous.      Left Ear: Ear canal and external ear normal. A middle ear effusion is present. Tympanic membrane is not erythematous.      Nose: Nose normal.      Mouth/Throat:      Lips: No lesions.      Mouth: Mucous membranes are moist.      Pharynx: Uvula midline. Pharyngeal swelling, posterior oropharyngeal erythema and uvula swelling present. No oropharyngeal exudate.      Tonsils: No tonsillar exudate or tonsillar abscesses. 3+ on the right. 3+ on the left.   Eyes:      Conjunctiva/sclera: Conjunctivae normal.   Cardiovascular:      Rate and Rhythm: Normal rate and regular rhythm.      Heart sounds: Normal heart sounds.   Pulmonary:      Effort: Pulmonary effort is normal. No respiratory distress.    "   Breath sounds: Normal breath sounds. No stridor. No wheezing, rhonchi or rales.   Lymphadenopathy:      Cervical: Cervical adenopathy present.   Skin:     General: Skin is warm and dry.      Coloration: Skin is not pale.   Neurological:      General: No focal deficit present.      Mental Status: He is alert.   Psychiatric:         Mood and Affect: Mood normal.       Assessment/Plan:     1. Benign essential HTN    2. Pharyngitis, unspecified etiology  - POCT CEPHEID GROUP A STREP - PCR    3. Acute cough  - POCT CEPHEID COV-2, FLU A/B, RSV - PCR  -Follow-up with primary care provider regarding elevated blood pressure reading  -Tylenol over-the-counter as needed for discomfort  -Warm salt water gargles for sore throat  -Return to clinic if symptoms worsen or fail to resolve    MDM/Comments:  Patient has a history of benign essential hypertension with an elevated blood pressure reading in the office.  Discussed with patient following up with primary care provider. I have prepared for this visit by personally reviewing the patient's prevous medical records, vitals, and labs including: most recent GFR of 118 and Creatinine of 0.88. Patient has stable vital signs and is non-toxic appearing. Discussed supportive care measures with warm salt water gargles, rest, tylenol OTC as needed for pain, and maintaining adequate hydration.  Strep and viral testing performed in office with negative results.  Patient demonstrated understanding of treatment plan and agreed to return to the clinic if symptoms worsen or fail to resolve.       Differential diagnosis, natural history, supportive care, and indications for immediate follow-up discussed. All questions answered. Patient agrees with the plan of care.    Follow-up as needed if symptoms worsen or fail to improve to PCP, Urgent care or Emergency Room.    I have personally reviewed prior external notes and test results pertinent to today's visit.  I have independently reviewed and  interpreted all diagnostics ordered during this urgent care acute visit.   Discussed management options (risks,benefits, and alternatives to treatment). Pt expresses understanding and the treatment plan was agreed upon. Questions were encouraged and answered to pt's satisfaction.    Please note that this dictation was created using voice recognition software. I have made a reasonable attempt to correct obvious errors, but I expect that there are errors of grammar and possibly content that I did not discover before finalizing the note.

## 2024-01-25 ENCOUNTER — OFFICE VISIT (OUTPATIENT)
Dept: BEHAVIORAL HEALTH | Facility: CLINIC | Age: 34
End: 2024-01-25
Payer: COMMERCIAL

## 2024-01-25 DIAGNOSIS — F33.42 RECURRENT MAJOR DEPRESSIVE DISORDER, IN FULL REMISSION (HCC): ICD-10-CM

## 2024-01-25 DIAGNOSIS — F41.1 GAD (GENERALIZED ANXIETY DISORDER): ICD-10-CM

## 2024-01-25 PROCEDURE — 90837 PSYTX W PT 60 MINUTES: CPT | Performed by: MARRIAGE & FAMILY THERAPIST

## 2024-01-26 NOTE — PROGRESS NOTES
[facility]   Therapy Progress Note        Name: You Sumner  MRN: [id number]  : 1990  Age: 33 y.o.  Date of assessment: 2024  PCP: VIOLETA Mendoza  Persons in attendance: Patient  Total session time: 58 minutes      Topics addressed in psychotherapy include: Met with the patient in office for an individual counseling session.      Content of Therapy:   The patient discussed that if he has been ill for the most of January patient reports that since his completion rate at school is low, he is unable to continue.  Patient reports that several coworkers has quit and that morale is down.  Patient discussed in their relational problems with friends.    Therapeutic Intervention:   This session, the therapeutic focus, was on supporting the patient.  He appeared to have very increased stress.  Assisted the patient and processing and difficult scenarios and situations in his life.  Worked with patient to not catastrophize issues.  This dictation has been created using voice recognition software and/or scribes. The accuracy of the dictation is limited by the abilities of the software and the expertise of the scribes. I expect there may be some errors of grammar and possibly content. I made every attempt to manually correct the errors within my dictation. However, errors related to voice recognition software and/or scribes may still exist and should be interpreted within the appropriate context.    Objective Observations:   Participation:Active verbal participation, Attentive, and Engaged   Grooming:Casual   Cognition:Alert and Fully Oriented   Eye Contact:Good   Mood:Depressed   Affect:Flexible, Full range, Congruent with content, and Sad   Thought Process:Logical and Goal-directed   Speech:Rate within normal limits and Volume within normal limits    Current Risk:   Suicide: low   Homicide: low   Self-Harm: low   Relapse: low   Safety Plan Reviewed: not applicable    Care  Plan Updated: No    Does patient express agreement with the above plan? Yes     Diagnosis:  1. AVERY (generalized anxiety disorder)    2. Recurrent major depressive disorder, in full remission (HCC)        Referral appointment(s) scheduled? No       BARRINGTON Dickson.

## 2024-01-29 ENCOUNTER — APPOINTMENT (OUTPATIENT)
Dept: MEDICAL GROUP | Facility: MEDICAL CENTER | Age: 34
End: 2024-01-29
Payer: COMMERCIAL

## 2024-02-02 ENCOUNTER — OFFICE VISIT (OUTPATIENT)
Dept: BEHAVIORAL HEALTH | Facility: CLINIC | Age: 34
End: 2024-02-02
Payer: COMMERCIAL

## 2024-02-02 DIAGNOSIS — F41.1 GAD (GENERALIZED ANXIETY DISORDER): ICD-10-CM

## 2024-02-02 DIAGNOSIS — F33.42 RECURRENT MAJOR DEPRESSIVE DISORDER, IN FULL REMISSION (HCC): ICD-10-CM

## 2024-02-02 PROCEDURE — 99214 OFFICE O/P EST MOD 30 MIN: CPT | Performed by: PSYCHIATRY & NEUROLOGY

## 2024-02-02 NOTE — PROGRESS NOTES
PSYCHIATRY FOLLOW-UP NOTE      Name: You Sumner  MRN: [id number]  : 1990  Age: 33 y.o.  Date of assessment: 2024  PCP: VIOLETA Mendoza  Persons in attendance: Patient      REASON FOR VISIT/CHIEF COMPLAINT (as stated by Patient):  You Sumner is a 33 y.o., White male, attending follow-up appointment for mood and anxiety management.      HISTORY OF PRESENT ILLNESS:  You Sumner is a 33 y.o. old male with MDD and AVERY comes in today for follow up. Patient was last seen 1 month ago, and following treatment planning recommendations were done:  Continue Prozac 40 mg daily for mood and anxiety management.    Add Wellbutrin  mg daily X 3 weeks --> if no improvement seen, increase to 300 mg daily for mood and focus management.   Continue psychotherapy for mood and anxiety management.        Compliant with Prozac 40 mg and Wellbutrin 150 mg with marked improvement in mood, anxiety and sleep.  Improvement in sexual side effects also seen with addition of Wellbutrin.  No side effect of negative impact on anxiety or irritability.  Engaged in individual therapy and couple counseling with good response.  Agreed with not titrating medications further.      CURRENT MEDICATIONS:  Current Outpatient Medications   Medication Sig Dispense Refill    fluoxetine (PROZAC) 40 MG capsule Take 1 Capsule by mouth every day. 90 Capsule 1    buPROPion (WELLBUTRIN XL) 150 MG XL tablet Take 1 Tablet by mouth every morning. 90 Tablet 1    losartan-hydrochlorothiazide (HYZAAR) 50-12.5 MG per tablet TAKE 2 TABLETS BY MOUTH DAILY 180 Tablet 3    albuterol 108 (90 Base) MCG/ACT Aero Soln inhalation aerosol Inhale 2 Puffs every four hours as needed for Shortness of Breath. 1 Each 0    Aspirin-Acetaminophen-Caffeine (EXCEDRIN PO) Take  by mouth.       No current facility-administered medications for this visit.       MEDICAL HISTORY  Past Medical History:   Diagnosis Date     Chickenpox     Head ache     Moderate episode of recurrent major depressive disorder (HCC) 2021    Seen by marriage guidance counselor 2022     Past Surgical History:   Procedure Laterality Date    APPENDECTOMY      TESTICLE EXPLORATION      undecended testicle       PAST PSYCHIATRIC MEDICATIONS  Prozac, Lexapro  Ritalin         REVIEW OF SYSTEMS:        Constitutional negative   Eyes negative   Ears/Nose/Mouth/Throat negative   Cardiovascular negative   Respiratory negative   Gastrointestinal negative   Genitourinary  DEBORAH on CPAP   Muscular negative   Integumentary negative   Neurological negative   Endocrine negative   Hematologic/Lymphatic negative     PHYSICAL EXAMINAION:  Vital signs: There were no vitals taken for this visit.  Musculoskeletal: Normal gait.   Abnormal movements: none      MENTAL STATUS EXAMINATION      General:   - Grooming and hygiene: Casual,   - Apparent distress: none,   - Behavior: Calm  - Eye Contact:  Good,   - no psychomotor agitation or retardation    - Participation: Active verbal participation  Orientation: Alert and Fully Oriented to person, place and time  Mood: Euthymic  Affect: Flexible and Full range,  Thought Process: Logical and Goal-directed  Thought Content: Denies suicidal or homicidal ideations, intent or plan   Perception: Denies auditory or visual hallucinations. No delusions noted   Attention span and concentration: Intact   Speech:Rate within normal limits and Volume within normal limits  Language: Appropriate   Insight: Good  Judgment: Good  Recent and remote memory: No gross evidence of memory deficits        DEPRESSION SCREENIN/23/2022     4:30 PM 2023     7:00 AM 2023     5:00 PM   Depression Screen (PHQ-2/PHQ-9)   PHQ-2 Total Score 2 0 2   PHQ-9 Total Score 9  4       Interpretation of PHQ-9 Total Score   Score Severity   1-4 No Depression   5-9 Mild Depression   10-14 Moderate Depression   15-19 Moderately Severe Depression   20-27  Severe Depression    CURRENT RISK:       Suicidal: Low       Homicidal: Low       Self-Harm: Low       Relapse: Low       Crisis Safety Plan Reviewed Not Indicated       If evidence of imminent risk is present, intervention/plan:      MEDICAL RECORDS/LABS/DIAGNOSTIC TESTS REVIEWED:  No new lab since last visit     [facility] [place] records -   Reviewed     PLAN:  (1) MDD; (2) AVERY  Improving  Continue Prozac 40 mg daily for mood and anxiety management.    Continue Wellbutrin  mg daily for mood and focus management.   Continue psychotherapy for mood and anxiety management.  Medication options, alternatives (including no medications) and medication risks/benefits/side effects were discussed in detail.  Explained importance of contraceptive measures while on psychotropic medications, educated to let provider know if ever pregnant or wanting to become pregnant. Verbalized understanding.  The patient was advised to call, message provider on [employer], or come in to the clinic if symptoms worsen or if any future questions/issues regarding their medications arise; the patient verbalized understanding and agreement.    The patient was educated to call 911, call the suicide hotline, or go to local ER if having thoughts of suicide or homicide; verbalized understanding.     Psychological Testing (6/5/23):  Based on the retrospective analysis, intake, review of self-report measures, and the behavioral observations and results of testing, patient does NOT meet criteria for adult ADHD.  Overall, patient agreed that a mood based hypothesis is a better fit for their symptoms than the ADHD hypothesis.  Diagnostic Impression: MDD, AVERY    Billing Coding based on:  08994 based on MDM    Return to clinic in 2 months or sooner if symptoms worsen.  Next Appointment: instruction provided on how to make the next appointment.     The proposed treatment plan was discussed with the patient who was provided the opportunity to ask questions and make  suggestions regarding alternative treatment. Patient verbalized understanding and expressed agreement with the plan.       Buddy Bernard M.D.  02/02/24    This note was created using voice recognition software (Dragon). The accuracy of the dictation is limited by the abilities of the software. I have reviewed the note prior to signing, however some errors in grammar and context are still possible. If you have any questions related to this note please do not hesitate to contact our office.

## 2024-03-18 ENCOUNTER — TELEMEDICINE (OUTPATIENT)
Dept: BEHAVIORAL HEALTH | Facility: CLINIC | Age: 34
End: 2024-03-18
Payer: COMMERCIAL

## 2024-03-18 DIAGNOSIS — F33.42 RECURRENT MAJOR DEPRESSIVE DISORDER, IN FULL REMISSION (HCC): ICD-10-CM

## 2024-03-18 DIAGNOSIS — F41.1 GAD (GENERALIZED ANXIETY DISORDER): ICD-10-CM

## 2024-03-18 PROCEDURE — 90837 PSYTX W PT 60 MINUTES: CPT | Mod: GT | Performed by: MARRIAGE & FAMILY THERAPIST

## 2024-03-18 NOTE — PROGRESS NOTES
[facility]   Therapy Progress Note      This visit was conducted via Zoom using secure and encrypted videoconferencing technology.  The patient was in a private location in the [facility].  The patient's identity was confirmed and verbal consent was obtained for this virtual visit.  Place of Service: POS 10 -The patient is at Home during their visit           Name: You Sumner  MRN: [id number]  : 1990  Age: 33 y.o.  Date of assessment: 3/18/2024  PCP: VIOLETA Mendoza  Persons in attendance: Patient  Total session time: 56 minutes    Objective Observations:   Participation:Active verbal participation, Attentive, and Engaged   Grooming:Casual   Cognition:Alert and Fully Oriented   Eye Contact:Good   Mood:Euthymic and Depressed   Affect:Flexible, Full range, Congruent with content, and Sad   Thought Process:Logical and Goal-directed   Speech:Rate within normal limits and Volume within normal limits    Current Risk:   Suicide: low   Homicide: low   Self-Harm: low   Relapse: low   Safety Plan Reviewed: not applicable    Topics addressed in psychotherapy include: Met with the patient VIA ZOOM for an individual counseling session.      Content of Therapy:   The patient discussed that last week was very difficult for him.  He discussed changes at work which make it a less desirable place to be.  Patient discussed feeling overwhelmed and looking for a new job.      Therapeutic Intervention:   This session, the therapeutic focus was on validating the patient's struggles in a work environment.  Appears that the patient might also be feeling stuck in as his wife moves forward in her classes and of a new job.  Worked with the patient on exploring his sense of identity has an individual not tied with his work identity.    This dictation has been created using voice recognition software and/or scribes. The accuracy of the dictation is limited by the abilities of the software and  the expertise of the scribes. I expect there may be some errors of grammar and possibly content. I made every attempt to manually correct the errors within my dictation. However, errors related to voice recognition software and/or scribes may still exist and should be interpreted within the appropriate context.    Care Plan Updated: No    Does patient express agreement with the above plan? Yes     Diagnosis:  1. AVERY (generalized anxiety disorder)    2. Recurrent major depressive disorder, in full remission (HCC)        Referral appointment(s) scheduled? No       BARRINGTON Dickson.

## 2024-04-04 ENCOUNTER — OFFICE VISIT (OUTPATIENT)
Dept: BEHAVIORAL HEALTH | Facility: CLINIC | Age: 34
End: 2024-04-04
Payer: COMMERCIAL

## 2024-04-04 DIAGNOSIS — F41.1 GAD (GENERALIZED ANXIETY DISORDER): ICD-10-CM

## 2024-04-04 DIAGNOSIS — F33.42 RECURRENT MAJOR DEPRESSIVE DISORDER, IN FULL REMISSION (HCC): ICD-10-CM

## 2024-04-04 PROCEDURE — 99214 OFFICE O/P EST MOD 30 MIN: CPT | Performed by: PSYCHIATRY & NEUROLOGY

## 2024-04-04 RX ORDER — BUPROPION HYDROCHLORIDE 300 MG/1
300 TABLET ORAL EVERY MORNING
Qty: 90 TABLET | Refills: 1 | Status: SHIPPED | OUTPATIENT
Start: 2024-04-04

## 2024-04-04 RX ORDER — FLUOXETINE HYDROCHLORIDE 40 MG/1
40 CAPSULE ORAL DAILY
Qty: 90 CAPSULE | Refills: 1 | Status: SHIPPED | OUTPATIENT
Start: 2024-04-04

## 2024-04-04 NOTE — PROGRESS NOTES
PSYCHIATRY FOLLOW-UP NOTE      Name: You Sumner  MRN: [id number]  : 1990  Age: 33 y.o.  Date of assessment: 2024  PCP: VIOLETA Mendoza  Persons in attendance: Patient and significant other      REASON FOR VISIT/CHIEF COMPLAINT (as stated by Patient):  You Sumner is a 33 y.o., White male, attending follow-up appointment for mood and anxiety management.      HISTORY OF PRESENT ILLNESS:  You Sumner is a 33 y.o. old male with MDD and AVERY comes in today for follow up. Patient was last seen 2 months ago, and following treatment planning recommendations were done:  Continue Prozac 40 mg daily for mood and anxiety management.    Continue Wellbutrin  mg daily for mood and focus management.   Continue psychotherapy for mood and anxiety management.      Compliant with medication but over the last few weeks have seen worsening of depression with low energy, low motivation and low drive.  Family members expressed concerns about these symptoms more at home.  Work stress has increased which might be contributing to that.  Agreed with increasing Wellbutrin to 300 mg daily dose to help with depression symptoms but educated to monitor closely for worsening of anxiety.      CURRENT MEDICATIONS:  Current Outpatient Medications   Medication Sig Dispense Refill    fluoxetine (PROZAC) 40 MG capsule Take 1 Capsule by mouth every day. 90 Capsule 1    buPROPion (WELLBUTRIN XL) 150 MG XL tablet Take 1 Tablet by mouth every morning. 90 Tablet 1    losartan-hydrochlorothiazide (HYZAAR) 50-12.5 MG per tablet TAKE 2 TABLETS BY MOUTH DAILY 180 Tablet 3    albuterol 108 (90 Base) MCG/ACT Aero Soln inhalation aerosol Inhale 2 Puffs every four hours as needed for Shortness of Breath. 1 Each 0    Aspirin-Acetaminophen-Caffeine (EXCEDRIN PO) Take  by mouth.       No current facility-administered medications for this visit.       MEDICAL HISTORY  Past Medical History:    Diagnosis Date    Chickenpox     Head ache     Moderate episode of recurrent major depressive disorder (HCC) 2021    Seen by marriage guidance counselor 2022     Past Surgical History:   Procedure Laterality Date    APPENDECTOMY      TESTICLE EXPLORATION      undecended testicle       PAST PSYCHIATRIC MEDICATIONS  Prozac, Lexapro  Ritalin         REVIEW OF SYSTEMS:        Constitutional negative   Eyes negative   Ears/Nose/Mouth/Throat negative   Cardiovascular negative   Respiratory negative   Gastrointestinal negative   Genitourinary  DEBORAH on CPAP   Muscular negative   Integumentary negative   Neurological negative   Endocrine negative   Hematologic/Lymphatic negative     PHYSICAL EXAMINAION:  Vital signs: There were no vitals taken for this visit.  Musculoskeletal: Normal gait.   Abnormal movements: none      MENTAL STATUS EXAMINATION      General:   - Grooming and hygiene: Casual,   - Apparent distress: none,   - Behavior: Calm  - Eye Contact:  Good,   - no psychomotor agitation or retardation    - Participation: Active verbal participation  Orientation: Alert and Fully Oriented to person, place and time  Mood: Depressed  Affect: Constricted,  Thought Process: Logical and Goal-directed  Thought Content: Denies suicidal or homicidal ideations, intent or plan   Perception: Denies auditory or visual hallucinations. No delusions noted   Attention span and concentration: Intact   Speech:Rate within normal limits and Volume within normal limits  Language: Appropriate   Insight: Good  Judgment: Good  Recent and remote memory: No gross evidence of memory deficits        DEPRESSION SCREENIN/23/2022     4:30 PM 2023     7:00 AM 2023     5:00 PM   Depression Screen (PHQ-2/PHQ-9)   PHQ-2 Total Score 2 0 2   PHQ-9 Total Score 9  4       Interpretation of PHQ-9 Total Score   Score Severity   1-4 No Depression   5-9 Mild Depression   10-14 Moderate Depression   15-19 Moderately Severe Depression    20-27 Severe Depression    CURRENT RISK:       Suicidal: Low       Homicidal: Low       Self-Harm: Low       Relapse: Low       Crisis Safety Plan Reviewed Not Indicated       If evidence of imminent risk is present, intervention/plan:      MEDICAL RECORDS/LABS/DIAGNOSTIC TESTS REVIEWED:  No new lab since last visit     [facility] [place] records -   Reviewed     PLAN:  (1) MDD; (2) AVERY  Depression symptoms  Continue Prozac 40 mg daily for mood and anxiety management.    Increase Wellbutrin  mg daily for mood and focus management.   Continue psychotherapy for mood and anxiety management.  Medication options, alternatives (including no medications) and medication risks/benefits/side effects were discussed in detail.  Explained importance of contraceptive measures while on psychotropic medications, educated to let provider know if ever pregnant or wanting to become pregnant. Verbalized understanding.  The patient was advised to call, message provider on [employer], or come in to the clinic if symptoms worsen or if any future questions/issues regarding their medications arise; the patient verbalized understanding and agreement.    The patient was educated to call 911, call the suicide hotline, or go to local ER if having thoughts of suicide or homicide; verbalized understanding.     Psychological Testing (6/5/23):  Based on the retrospective analysis, intake, review of self-report measures, and the behavioral observations and results of testing, patient does NOT meet criteria for adult ADHD.  Overall, patient agreed that a mood based hypothesis is a better fit for their symptoms than the ADHD hypothesis.  Diagnostic Impression: MDD, AVERY    Billing Coding based on:  73711 based on MDM    Return to clinic in 6 weeks or sooner if symptoms worsen.  Next Appointment: instruction provided on how to make the next appointment.     The proposed treatment plan was discussed with the patient who was provided the opportunity to ask  questions and make suggestions regarding alternative treatment. Patient verbalized understanding and expressed agreement with the plan.       Buddy Bernard M.D.  04/04/24    This note was created using voice recognition software (Dragon). The accuracy of the dictation is limited by the abilities of the software. I have reviewed the note prior to signing, however some errors in grammar and context are still possible. If you have any questions related to this note please do not hesitate to contact our office.

## 2024-04-29 ENCOUNTER — TELEMEDICINE (OUTPATIENT)
Dept: BEHAVIORAL HEALTH | Facility: CLINIC | Age: 34
End: 2024-04-29
Payer: COMMERCIAL

## 2024-04-29 DIAGNOSIS — F41.1 GAD (GENERALIZED ANXIETY DISORDER): ICD-10-CM

## 2024-04-29 DIAGNOSIS — F33.42 RECURRENT MAJOR DEPRESSIVE DISORDER, IN FULL REMISSION (HCC): ICD-10-CM

## 2024-04-30 NOTE — PROGRESS NOTES
[facility]   Therapy Progress Note      This visit was conducted via Zoom using secure and encrypted videoconferencing technology.  The patient was in a private location in the [facility].  The patient's identity was confirmed and verbal consent was obtained for this virtual visit.  Place of Service: POS 10 -The patient is at Home during their visit           Name: You Sumner  MRN: [id number]  : 1990  Age: 33 y.o.  Date of assessment: 2024  PCP: VIOLETA Mendoza  Persons in attendance: Patient  Total session time: 56 minutes    Objective Observations:   Participation:Active verbal participation, Attentive, and Engaged   Grooming:Casual   Cognition:Alert and Fully Oriented   Eye Contact:Good   Mood:Euthymic, Depressed, and Anxious   Affect:Flexible, Full range, and Congruent with content   Thought Process:Logical and Goal-directed   Speech:Rate within normal limits and Volume within normal limits    Current Risk:   Suicide: low   Homicide: low   Self-Harm: low   Relapse: low   Safety Plan Reviewed: not applicable    Topics addressed in psychotherapy include: Met with the patient via zoom for an individual counseling session.      Content of Therapy:   Patient reported that he had a very frustrating day at work today and is terrified about his ability to be successful in fulltime academic endeavors.  Patient discussed that he is getting into an apathetic stage of depression.    Therapeutic Intervention:   This session, the therapeutic focus was on providing supportive psychotherapy.  Discussed off and points of view regarding his abilities and school.  Worked with the patient on this team building.    This dictation has been created using voice recognition software and/or scribes. The accuracy of the dictation is limited by the abilities of the software and the expertise of the scribes. I expect there may be some errors of grammar and possibly content. I made  every attempt to manually correct the errors within my dictation. However, errors related to voice recognition software and/or scribes may still exist and should be interpreted within the appropriate context.    Care Plan Updated: No    Does patient express agreement with the above plan? Yes     Diagnosis:  1. Recurrent major depressive disorder, in full remission (HCC)    2. AVERY (generalized anxiety disorder)        Referral appointment(s) scheduled? No       BARRINGTON Dickson.

## 2024-05-14 ENCOUNTER — OFFICE VISIT (OUTPATIENT)
Dept: MEDICAL GROUP | Facility: MEDICAL CENTER | Age: 34
End: 2024-05-14
Payer: COMMERCIAL

## 2024-05-14 VITALS
OXYGEN SATURATION: 97 % | HEIGHT: 76 IN | SYSTOLIC BLOOD PRESSURE: 112 MMHG | BODY MASS INDEX: 38.36 KG/M2 | HEART RATE: 72 BPM | TEMPERATURE: 97.2 F | DIASTOLIC BLOOD PRESSURE: 82 MMHG | WEIGHT: 315 LBS

## 2024-05-14 DIAGNOSIS — Z99.89 CPAP (CONTINUOUS POSITIVE AIRWAY PRESSURE) DEPENDENCE: ICD-10-CM

## 2024-05-14 DIAGNOSIS — R19.6 HALITOSIS: ICD-10-CM

## 2024-05-14 DIAGNOSIS — Z11.4 ENCOUNTER FOR SCREENING FOR HIV: ICD-10-CM

## 2024-05-14 DIAGNOSIS — G47.33 OBSTRUCTIVE SLEEP APNEA SYNDROME: ICD-10-CM

## 2024-05-14 DIAGNOSIS — Z13.0 SCREENING FOR ENDOCRINE, METABOLIC AND IMMUNITY DISORDER: ICD-10-CM

## 2024-05-14 DIAGNOSIS — Z13.228 SCREENING FOR ENDOCRINE, METABOLIC AND IMMUNITY DISORDER: ICD-10-CM

## 2024-05-14 DIAGNOSIS — Z13.29 SCREENING FOR ENDOCRINE, METABOLIC AND IMMUNITY DISORDER: ICD-10-CM

## 2024-05-14 DIAGNOSIS — R10.30 LOWER ABDOMINAL PAIN: ICD-10-CM

## 2024-05-14 DIAGNOSIS — Z13.6 SCREENING FOR CARDIOVASCULAR CONDITION: ICD-10-CM

## 2024-05-14 PROCEDURE — 99214 OFFICE O/P EST MOD 30 MIN: CPT

## 2024-05-14 PROCEDURE — 3074F SYST BP LT 130 MM HG: CPT

## 2024-05-14 PROCEDURE — 3079F DIAST BP 80-89 MM HG: CPT

## 2024-05-14 ASSESSMENT — FIBROSIS 4 INDEX: FIB4 SCORE: 0.56

## 2024-05-14 ASSESSMENT — ENCOUNTER SYMPTOMS
FEVER: 0
SHORTNESS OF BREATH: 0
ORTHOPNEA: 0
COUGH: 0
CHILLS: 0
PALPITATIONS: 0

## 2024-05-14 ASSESSMENT — PATIENT HEALTH QUESTIONNAIRE - PHQ9: CLINICAL INTERPRETATION OF PHQ2 SCORE: 0

## 2024-05-14 NOTE — PROGRESS NOTES
Subjective:     Verbal consent was acquired by the patient to use [employer] ambient listening note generation during this visit {YES/NO:[id number]}   CC: Follow-Up (Pt would like to discuss severe abdominal pain since Wednesday last week for about 3-4 days. )      HPI:   You is a 33 y.o. male who presents today for:    History of Present Illness  The patient presents for evaluation of stomach pain.    The patient initially experienced acute lower abdominal pain for a duration of 3 to 4 days, which has since resolved. He expresses concern due to the persistence of this symptom. The etiology of the pain remains uncertain, potentially due to dietary intake or a stomach bug. His mother-in-law suggested the possibility of an ulcer. The patient's symptoms initiated last Wednesday and persisted until Sunday evening. He reports increased bloating, which is unusual for him, and describes his abdomen as soft upon palpation. The pain, which he describes as extending from the lower part of his abdomen to the upper part of his abdomen, was described as akin to muscle overuse, akin to excessive crunches. Eating does not exacerbate or alleviate his symptoms. During these episodes, he experienced regular, albeit low in quantity, suggesting possible constipation. He denies any presence of blood in his stool. Accompanying symptoms included minor nausea, but no vomiting. Eating did not exacerbate or alleviate his symptoms. Despite feeling full, the pain persisted, accompanied by hunger. He experienced significant gas yesterday, which he describes as unpleasant. He suspects constipation in his large and lower intestines. He also reports gastrointestinal issues.    Supplemental Information  His wife wants him to see a gastroenterologist because she is convinced that he has halitosis. He has not seen a dentist recently. He has been brushing his teeth before he goes to bed. He will be fine, but then 10 minutes later, his wife can  "smell it. He does not think it is his teeth. This has been going on for a long time.         Allergies: Patient has no known allergies.     Medications:   Current Outpatient Medications:     multivitamin Tab, Take 1 Tablet by mouth every day., Disp: , Rfl:     fluoxetine (PROZAC) 40 MG capsule, Take 1 Capsule by mouth every day., Disp: 90 Capsule, Rfl: 1    buPROPion (WELLBUTRIN XL) 300 MG XL tablet, Take 1 Tablet by mouth every morning., Disp: 90 Tablet, Rfl: 1    losartan-hydrochlorothiazide (HYZAAR) 50-12.5 MG per tablet, TAKE 2 TABLETS BY MOUTH DAILY, Disp: 180 Tablet, Rfl: 3    albuterol 108 (90 Base) MCG/ACT Aero Soln inhalation aerosol, Inhale 2 Puffs every four hours as needed for Shortness of Breath., Disp: 1 Each, Rfl: 0    Aspirin-Acetaminophen-Caffeine (EXCEDRIN PO), Take  by mouth., Disp: , Rfl:       ROS:  Review of Systems   Constitutional:  Negative for chills and fever.   Respiratory:  Negative for cough and shortness of breath.    Cardiovascular:  Negative for chest pain, palpitations, orthopnea and leg swelling.   ***    Objective:     Exam:  /82   Pulse 72   Temp 36.2 °C (97.2 °F) (Temporal)   Ht 1.93 m (6' 4\")   Wt (!) 143 kg (315 lb 3.2 oz)   SpO2 97%   BMI 38.37 kg/m²  Body mass index is 38.37 kg/m².    Physical Exam  Constitutional:       Appearance: He is normal weight.   Eyes:      Pupils: Pupils are equal, round, and reactive to light.   Cardiovascular:      Rate and Rhythm: Normal rate and regular rhythm.      Pulses: Normal pulses.      Heart sounds: Normal heart sounds.   Pulmonary:      Effort: Pulmonary effort is normal.      Breath sounds: Normal breath sounds.   Neurological:      Mental Status: He is alert and oriented to person, place, and time.   Psychiatric:         Mood and Affect: Mood normal.         Behavior: Behavior normal.           Assessment & Plan:     You a 33 y.o. male with the following -     Assessment & Plan      1. Lower abdominal pain    2. " Halitosis    3. Obstructive sleep apnea syndrome    4. CPAP (continuous positive airway pressure) dependence    5. Encounter for screening for HIV  - HIV AG/AB COMBO ASSAY SCREENING; Future    6. Screening for endocrine, metabolic and immunity disorder  - HEMOGLOBIN A1C; Future  - TSH WITH REFLEX TO FT4; Future  - Comp Metabolic Panel; Future  - CBC WITHOUT DIFFERENTIAL; Future    7. Screening for cardiovascular condition  - Lipid Profile; Future    Other orders  - multivitamin Tab; Take 1 Tablet by mouth every day.        Anticipatory guidance included the following: Patient counseled about skin care, diet, supplements, smoking, drugs/alcohol use, safe sex and exercise.     No follow-ups on file.    Please note that this dictation was created using voice recognition software. I have made every reasonable attempt to correct obvious errors, but I expect that there are errors of grammar and possibly content that I did not discover before finalizing the note.      I have placed the below orders and discussed them with an approved delegating provider.  The MA is performing the below orders under the direction of ***.

## 2024-05-14 NOTE — PROGRESS NOTES
Subjective:     Verbal consent was acquired by the patient to use [employer] ambient listening note generation during this visit Yes    CC: Follow-Up (Pt would like to discuss severe abdominal pain since Wednesday last week for about 3-4 days. )      HPI:   You is a 33 y.o. male who presents today for:    History of Present Illness  The patient presents for evaluation of multiple medical concerns.    Abdominal pain: The patient initially experienced acute lower abdominal pain lasting 3 to 4 days, which has since resolved. He expresses concern due to the intensity of the pain, describing it as similar to his previous pain associated with an inflamed appendix, which necessitated an appendectomy. The etiology of the pain remains uncertain, as his mother-in-law suspects an ulcer. The patient's symptoms commenced last Wednesday and persisted until Sunday evening. He reports increased abdominal distension and softness, with the pain extending from the lower part of his abdomen. He describes a sensation of muscle soreness and tightness when overeating. His bowel movements, although low in quantity, were regular on Sunday, suggesting possible constipation. He denies hematochezia but reports minor nausea without vomiting. Eating does not exacerbate or alleviate his symptoms. He reports early satiety, pain, and hunger, which persists even after small meals. He also experienced significant flatulence yesterday, which he describes as unpleasant. He suspects intestinal obstruction, despite ongoing gastrointestinal issues. He finds relief from pain post-defecation, even during periods of relaxation. Currently, he reports no issues. He suspects stress may be a contributing factor to his symptoms. He denies experiencing heartburn but reports acid reflux. His diet consists of stir-fried chicken, rice, and vegetables.    Halitosis/ DEBORAH: The patient's wife has suggested a consultation with a gastroenterologist due to  "suspected halitosis. This condition has caused him to be self-conscious. He has not recently visited a dentist but maintains regular teeth brushing. He reports an unpleasant odor 10 minutes after brushing his teeth, a symptom that has persisted for years. He has been attempting to modify his eating habits and denies xerostomia. He occasionally feels faint and requires snacks but is uncertain if this is related to his blood sugar levels. He is aware of his prediabetic status. He denies postnasal drip but reports waking up with a dry nose, which he attributes to his CPAP machine. He frequently cleans and swaps his CPAP parts. He reports increased REM sleep and is dreaming more since using his CPAP, and has a scheduled appointment with pulnonology in 10/2024.           Allergies: Patient has no known allergies.     Medications:   Current Outpatient Medications:     multivitamin Tab, Take 1 Tablet by mouth every day., Disp: , Rfl:     fluoxetine (PROZAC) 40 MG capsule, Take 1 Capsule by mouth every day., Disp: 90 Capsule, Rfl: 1    buPROPion (WELLBUTRIN XL) 300 MG XL tablet, Take 1 Tablet by mouth every morning., Disp: 90 Tablet, Rfl: 1    losartan-hydrochlorothiazide (HYZAAR) 50-12.5 MG per tablet, TAKE 2 TABLETS BY MOUTH DAILY, Disp: 180 Tablet, Rfl: 3    albuterol 108 (90 Base) MCG/ACT Aero Soln inhalation aerosol, Inhale 2 Puffs every four hours as needed for Shortness of Breath., Disp: 1 Each, Rfl: 0    Aspirin-Acetaminophen-Caffeine (EXCEDRIN PO), Take  by mouth., Disp: , Rfl:       ROS:  Review of Systems   Constitutional:  Negative for chills and fever.   Respiratory:  Negative for cough and shortness of breath.    Cardiovascular:  Negative for chest pain, palpitations, orthopnea and leg swelling.       Objective:     Exam:  /82   Pulse 72   Temp 36.2 °C (97.2 °F) (Temporal)   Ht 1.93 m (6' 4\")   Wt (!) 143 kg (315 lb 3.2 oz)   SpO2 97%   BMI 38.37 kg/m²  Body mass index is 38.37 kg/m².    Physical " Exam  Constitutional:       Appearance: He is normal weight.   Eyes:      Pupils: Pupils are equal, round, and reactive to light.   Cardiovascular:      Rate and Rhythm: Normal rate and regular rhythm.      Pulses: Normal pulses.      Heart sounds: Normal heart sounds.   Pulmonary:      Effort: Pulmonary effort is normal.      Breath sounds: Normal breath sounds.   Neurological:      Mental Status: He is alert and oriented to person, place, and time.   Psychiatric:         Mood and Affect: Mood normal.         Behavior: Behavior normal.           Assessment & Plan:     You nelson 33 y.o. male with the following -       1. Lower abdominal pain  Undiagnosed problem with uncertain prognosis   The patient's gastrointestinal issues could be attributed to gastritis, a viral infection, an ulcer, colitis, or diverticular issues. The stress could be a contributing factor to his symptoms. A repeat blood work will be ordered. The patient was advised to monitor symptoms, as his pain has resolved. If a recurrence of symptoms occurs, he was recommended to try over-the-counter omeprazole 20 mg once daily. He was advised to avoid nuts, berries, seeds, and anything in small quantities. He was also advised to adhere to a bland diet for the next week. If his symptoms recur after starting omeprazole, he will send a bulletn. message, and we will refer him to a GI specialist. If the pain recurs, worsens, or if he notices blood in his stool, he should seek immediate medical attention at the ER.    2. Halitosis  Undiagnosed problem with uncertain prognosis  The patient's blood sugar levels may be contributing to an unpleasant odor. However, his A1c levels were within the acceptable range last year. A recheck of his A1c levels will be conducted this year, given that it has been a year since his last set of labs. The patient was educated about various causes of halitosis, including dry mouth, dental issues, liver issues, diabetes, and  gingivitis. He was advised to consult a dentist to rule out any dental issues.    3. Obstructive sleep apnea syndrome  4. CPAP (continuous positive airway pressure) dependence  Chronic, stable  -Follow-up with pulmonology as directed    5. Encounter for screening for HIV  - HIV AG/AB COMBO ASSAY SCREENING; Future    6. Screening for endocrine, metabolic and immunity disorder  - HEMOGLOBIN A1C; Future  - TSH WITH REFLEX TO FT4; Future  - Comp Metabolic Panel; Future  - CBC WITHOUT DIFFERENTIAL; Future    7. Screening for cardiovascular condition  - Lipid Profile; Future    Other orders  - multivitamin Tab; Take 1 Tablet by mouth every day.        Anticipatory guidance included the following: Patient counseled about skin care, diet, supplements, smoking, drugs/alcohol use, safe sex and exercise.     Return in about 6 weeks (around 6/25/2024), or if symptoms worsen or fail to improve.    Please note that this dictation was created using voice recognition software. I have made every reasonable attempt to correct obvious errors, but I expect that there are errors of grammar and possibly content that I did not discover before finalizing the note.

## 2024-05-16 ENCOUNTER — TELEMEDICINE (OUTPATIENT)
Dept: BEHAVIORAL HEALTH | Facility: CLINIC | Age: 34
End: 2024-05-16
Payer: COMMERCIAL

## 2024-05-16 DIAGNOSIS — F41.1 GAD (GENERALIZED ANXIETY DISORDER): ICD-10-CM

## 2024-05-16 DIAGNOSIS — F33.42 RECURRENT MAJOR DEPRESSIVE DISORDER, IN FULL REMISSION (HCC): ICD-10-CM

## 2024-05-16 PROCEDURE — 99214 OFFICE O/P EST MOD 30 MIN: CPT | Mod: GT | Performed by: PSYCHIATRY & NEUROLOGY

## 2024-05-16 RX ORDER — FLUOXETINE HYDROCHLORIDE 40 MG/1
40 CAPSULE ORAL DAILY
Qty: 90 CAPSULE | Refills: 1 | Status: SHIPPED | OUTPATIENT
Start: 2024-05-16

## 2024-05-16 RX ORDER — BUPROPION HYDROCHLORIDE 300 MG/1
300 TABLET ORAL EVERY MORNING
Qty: 90 TABLET | Refills: 1 | Status: SHIPPED | OUTPATIENT
Start: 2024-05-16

## 2024-05-16 NOTE — PROGRESS NOTES
This evaluation was conducted via Zoom using secure and encrypted videoconferencing technology. The patient was in a private location outside of their home in the state of [place].    The patient's identity was confirmed and verbal consent was obtained for this virtual visit.      PSYCHIATRY FOLLOW-UP NOTE      Name: You Sumner  MRN: [id number]  : 1990  Age: 33 y.o.  Date of assessment: 2024  PCP: VIOLETA Mendoza  Persons in attendance: Patient      REASON FOR VISIT/CHIEF COMPLAINT (as stated by Patient):  You Sumner is a 33 y.o., White male, attending follow-up appointment for mood and anxiety management.      HISTORY OF PRESENT ILLNESS:  You Sumner is a 33 y.o. old male with MDD and AVERY comes in today for follow up. Patient was last seen 6 weeks ago, and following treatment planning recommendations were done:  Continue Prozac 40 mg daily for mood and anxiety management.    Increase Wellbutrin  mg daily for mood and focus management.   Continue psychotherapy for mood and anxiety management.    Compliant with increase in wellbutrin: improved mood with improved ability to deal with stress now.   He will start the school soon. He gave 2 week notice at work.  He feels better energy to go to gym now.  Overall doing well with stable mood and anxiety symptoms.  Family member has also seen the improvement.  Agreed with plan of not titrating medications further.      CURRENT MEDICATIONS:  Current Outpatient Medications   Medication Sig Dispense Refill    multivitamin Tab Take 1 Tablet by mouth every day.      fluoxetine (PROZAC) 40 MG capsule Take 1 Capsule by mouth every day. 90 Capsule 1    buPROPion (WELLBUTRIN XL) 300 MG XL tablet Take 1 Tablet by mouth every morning. 90 Tablet 1    losartan-hydrochlorothiazide (HYZAAR) 50-12.5 MG per tablet TAKE 2 TABLETS BY MOUTH DAILY 180 Tablet 3    albuterol 108 (90 Base) MCG/ACT Aero Soln inhalation  aerosol Inhale 2 Puffs every four hours as needed for Shortness of Breath. 1 Each 0    Aspirin-Acetaminophen-Caffeine (EXCEDRIN PO) Take  by mouth.       No current facility-administered medications for this visit.       MEDICAL HISTORY  Past Medical History:   Diagnosis Date    Chickenpox     Head ache     Moderate episode of recurrent major depressive disorder (HCC) 2021    Seen by marriage guidance counselor 2022     Past Surgical History:   Procedure Laterality Date    APPENDECTOMY      TESTICLE EXPLORATION      undecended testicle       PAST PSYCHIATRIC MEDICATIONS  Prozac, Lexapro  Wellbutrin    Ritalin         REVIEW OF SYSTEMS:        Constitutional negative   Eyes negative   Ears/Nose/Mouth/Throat negative   Cardiovascular negative   Respiratory negative   Gastrointestinal negative   Genitourinary  DEBORAH on CPAP   Muscular negative   Integumentary negative   Neurological negative   Endocrine negative   Hematologic/Lymphatic negative     PHYSICAL EXAMINAION:  Vital signs: There were no vitals taken for this visit.  Musculoskeletal: Normal gait.   Abnormal movements: none      MENTAL STATUS EXAMINATION      General:   - Grooming and hygiene: Casual,   - Apparent distress: none,   - Behavior: Calm  - Eye Contact:  Good,   - no psychomotor agitation or retardation    - Participation: Active verbal participation  Orientation: Alert and Fully Oriented to person, place and time  Mood: Euthymic  Affect: Flexible and Full range,  Thought Process: Logical and Goal-directed  Thought Content: Denies suicidal or homicidal ideations, intent or plan   Perception: Denies auditory or visual hallucinations. No delusions noted   Attention span and concentration: Intact   Speech:Rate within normal limits and Volume within normal limits  Language: Appropriate   Insight: Good  Judgment: Good  Recent and remote memory: No gross evidence of memory deficits        DEPRESSION SCREENIN/13/2023     7:00 AM 2023      5:00 PM 5/14/2024    11:00 AM   Depression Screen (PHQ-2/PHQ-9)   PHQ-2 Total Score 0 2 0   PHQ-9 Total Score  4        Interpretation of PHQ-9 Total Score   Score Severity   1-4 No Depression   5-9 Mild Depression   10-14 Moderate Depression   15-19 Moderately Severe Depression   20-27 Severe Depression    CURRENT RISK:       Suicidal: Low       Homicidal: Low       Self-Harm: Low       Relapse: Low       Crisis Safety Plan Reviewed Not Indicated       If evidence of imminent risk is present, intervention/plan:      MEDICAL RECORDS/LABS/DIAGNOSTIC TESTS REVIEWED:  No new lab since last visit     [place]  records -   Reviewed     PLAN:  (1) MDD; (2) AVERY  Stable  Continue Prozac 40 mg daily for mood and anxiety management.    Continue Wellbutrin  mg daily for mood and focus management.   Continue psychotherapy for mood and anxiety management.  Medication options, alternatives (including no medications) and medication risks/benefits/side effects were discussed in detail.  Explained importance of contraceptive measures while on psychotropic medications, educated to let provider know if ever pregnant or wanting to become pregnant. Verbalized understanding.  The patient was advised to call, message provider on [employer], or come in to the clinic if symptoms worsen or if any future questions/issues regarding their medications arise; the patient verbalized understanding and agreement.    The patient was educated to call 911, call the suicide hotline, or go to local ER if having thoughts of suicide or homicide; verbalized understanding.     Psychological Testing (6/5/23):  Based on the retrospective analysis, intake, review of self-report measures, and the behavioral observations and results of testing, patient does NOT meet criteria for adult ADHD.  Overall, patient agreed that a mood based hypothesis is a better fit for their symptoms than the ADHD hypothesis.  Diagnostic Impression: MDD, AVERY    Billing Coding based  on:  96907 based on MDM    Return to clinic in 1-3 months or sooner if symptoms worsen.  Next Appointment: instruction provided on how to make the next appointment.     The proposed treatment plan was discussed with the patient who was provided the opportunity to ask questions and make suggestions regarding alternative treatment. Patient verbalized understanding and expressed agreement with the plan.       Buddy Bernard M.D.  05/16/24    This note was created using voice recognition software (Dragon). The accuracy of the dictation is limited by the abilities of the software. I have reviewed the note prior to signing, however some errors in grammar and context are still possible. If you have any questions related to this note please do not hesitate to contact our office.

## 2024-05-29 ENCOUNTER — HOSPITAL ENCOUNTER (OUTPATIENT)
Dept: LAB | Facility: MEDICAL CENTER | Age: 34
End: 2024-05-29
Payer: COMMERCIAL

## 2024-05-29 DIAGNOSIS — Z13.228 SCREENING FOR ENDOCRINE, METABOLIC AND IMMUNITY DISORDER: ICD-10-CM

## 2024-05-29 DIAGNOSIS — Z11.4 ENCOUNTER FOR SCREENING FOR HIV: ICD-10-CM

## 2024-05-29 DIAGNOSIS — Z13.6 SCREENING FOR CARDIOVASCULAR CONDITION: ICD-10-CM

## 2024-05-29 DIAGNOSIS — Z13.29 SCREENING FOR ENDOCRINE, METABOLIC AND IMMUNITY DISORDER: ICD-10-CM

## 2024-05-29 DIAGNOSIS — Z13.0 SCREENING FOR ENDOCRINE, METABOLIC AND IMMUNITY DISORDER: ICD-10-CM

## 2024-05-29 LAB
ERYTHROCYTE [DISTWIDTH] IN BLOOD BY AUTOMATED COUNT: 43.1 FL (ref 35.9–50)
EST. AVERAGE GLUCOSE BLD GHB EST-MCNC: 117 MG/DL
HBA1C MFR BLD: 5.7 % (ref 4–5.6)
HCT VFR BLD AUTO: 42.8 % (ref 42–52)
HGB BLD-MCNC: 13.8 G/DL (ref 14–18)
HIV 1+2 AB+HIV1 P24 AG SERPL QL IA: NORMAL
MCH RBC QN AUTO: 26.8 PG (ref 27–33)
MCHC RBC AUTO-ENTMCNC: 32.2 G/DL (ref 32.3–36.5)
MCV RBC AUTO: 83.3 FL (ref 81.4–97.8)
PLATELET # BLD AUTO: 266 K/UL (ref 164–446)
PMV BLD AUTO: 9.4 FL (ref 9–12.9)
RBC # BLD AUTO: 5.14 M/UL (ref 4.7–6.1)
TSH SERPL DL<=0.005 MIU/L-ACNC: 2.51 UIU/ML (ref 0.38–5.33)
WBC # BLD AUTO: 7.5 K/UL (ref 4.8–10.8)

## 2024-05-30 LAB
ALBUMIN SERPL BCP-MCNC: 4.1 G/DL (ref 3.2–4.9)
ALBUMIN/GLOB SERPL: 1.4 G/DL
ALP SERPL-CCNC: 73 U/L (ref 30–99)
ALT SERPL-CCNC: 28 U/L (ref 2–50)
ANION GAP SERPL CALC-SCNC: 12 MMOL/L (ref 7–16)
AST SERPL-CCNC: 24 U/L (ref 12–45)
BILIRUB SERPL-MCNC: 0.3 MG/DL (ref 0.1–1.5)
BUN SERPL-MCNC: 18 MG/DL (ref 8–22)
CALCIUM ALBUM COR SERPL-MCNC: 9 MG/DL (ref 8.5–10.5)
CALCIUM SERPL-MCNC: 9.1 MG/DL (ref 8.5–10.5)
CHLORIDE SERPL-SCNC: 103 MMOL/L (ref 96–112)
CHOLEST SERPL-MCNC: 157 MG/DL (ref 100–199)
CO2 SERPL-SCNC: 25 MMOL/L (ref 20–33)
CREAT SERPL-MCNC: 0.77 MG/DL (ref 0.5–1.4)
GFR SERPLBLD CREATININE-BSD FMLA CKD-EPI: 122 ML/MIN/1.73 M 2
GLOBULIN SER CALC-MCNC: 2.9 G/DL (ref 1.9–3.5)
GLUCOSE SERPL-MCNC: 79 MG/DL (ref 65–99)
HDLC SERPL-MCNC: 37 MG/DL
LDLC SERPL CALC-MCNC: 89 MG/DL
POTASSIUM SERPL-SCNC: 4 MMOL/L (ref 3.6–5.5)
PROT SERPL-MCNC: 7 G/DL (ref 6–8.2)
SODIUM SERPL-SCNC: 140 MMOL/L (ref 135–145)
TRIGL SERPL-MCNC: 153 MG/DL (ref 0–149)

## 2024-06-26 DIAGNOSIS — F33.42 RECURRENT MAJOR DEPRESSIVE DISORDER, IN FULL REMISSION (HCC): ICD-10-CM

## 2024-06-26 DIAGNOSIS — F41.1 GAD (GENERALIZED ANXIETY DISORDER): ICD-10-CM

## 2024-06-26 RX ORDER — FLUOXETINE HYDROCHLORIDE 40 MG/1
40 CAPSULE ORAL DAILY
Qty: 90 CAPSULE | Refills: 1 | Status: SHIPPED | OUTPATIENT
Start: 2024-06-26

## 2024-08-09 ENCOUNTER — OFFICE VISIT (OUTPATIENT)
Dept: BEHAVIORAL HEALTH | Facility: CLINIC | Age: 34
End: 2024-08-09
Payer: COMMERCIAL

## 2024-08-09 DIAGNOSIS — F33.42 RECURRENT MAJOR DEPRESSIVE DISORDER, IN FULL REMISSION (HCC): ICD-10-CM

## 2024-08-09 DIAGNOSIS — F41.1 GAD (GENERALIZED ANXIETY DISORDER): ICD-10-CM

## 2024-08-09 PROCEDURE — 99214 OFFICE O/P EST MOD 30 MIN: CPT | Performed by: PSYCHIATRY & NEUROLOGY

## 2024-08-09 RX ORDER — FLUOXETINE 40 MG/1
40 CAPSULE ORAL DAILY
Qty: 90 CAPSULE | Refills: 1 | Status: SHIPPED | OUTPATIENT
Start: 2024-08-09

## 2024-08-09 RX ORDER — BUPROPION HYDROCHLORIDE 300 MG/1
300 TABLET ORAL EVERY MORNING
Qty: 90 TABLET | Refills: 1 | Status: SHIPPED | OUTPATIENT
Start: 2024-08-09

## 2024-08-09 NOTE — PROGRESS NOTES
"  PSYCHIATRY FOLLOW-UP NOTE      Name: You Sumner  MRN: [id number]  : 1990  Age: 33 y.o.  Date of assessment: 2024  PCP: VIOLETA Mendoza  Persons in attendance: Patient      REASON FOR VISIT/CHIEF COMPLAINT (as stated by Patient):  You Sumner is a 33 y.o., White male, attending follow-up appointment for mood and anxiety management.      HISTORY OF PRESENT ILLNESS:  You Sumner is a 33 y.o. old male with MDD and AVERY comes in today for follow up. Patient was last seen 3 months ago, and following treatment planning recommendations were done:  Continue Prozac 40 mg daily for mood and anxiety management.    Continue Wellbutrin  mg daily for mood and focus management.   Continue psychotherapy for mood and anxiety management.    History of Present Illness  The patient presents for evaluation of anxiety and depression.    His last visit was 3 months ago. His current medication regimen includes Prozac 40 mg at night and Wellbutrin  mg in the morning. He attended a therapy session this morning and reports that the combination of therapy has been beneficial. '  His wife has observed an improvement in his condition, attributing this improvement to the medication effectively managing his depression symptoms.   However, he is interested in anxiety medication, as his depression episodes only occur after episodes of anxiety.   His wife believes his anxiety is more severe than his depression.   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 He took a 16-week summer class, which he found stressful. He is now taking 4 classes this coming semester, with three of whom have labs, which he finds more stressful.   After reviewing risks and benefits, agreed with increasing Prozac to 60 mg daily dose " to target anxiety more effectively with close monitoring for side effect primarily sedation.    Supplemental Information  He is on treatment for sleep apnea and tries to use CPAP every night.           CURRENT MEDICATIONS:  Current Outpatient Medications   Medication Sig Dispense Refill    fluoxetine (PROZAC) 40 MG capsule TAKE 1 CAPSULE BY MOUTH EVERY DAY 90 Capsule 1    buPROPion (WELLBUTRIN XL) 300 MG XL tablet Take 1 Tablet by mouth every morning. 90 Tablet 1    multivitamin Tab Take 1 Tablet by mouth every day.      losartan-hydrochlorothiazide (HYZAAR) 50-12.5 MG per tablet TAKE 2 TABLETS BY MOUTH DAILY 180 Tablet 3    albuterol 108 (90 Base) MCG/ACT Aero Soln inhalation aerosol Inhale 2 Puffs every four hours as needed for Shortness of Breath. 1 Each 0    Aspirin-Acetaminophen-Caffeine (EXCEDRIN PO) Take  by mouth.       No current facility-administered medications for this visit.       MEDICAL HISTORY  Past Medical History:   Diagnosis Date    Chickenpox     Head ache     Moderate episode of recurrent major depressive disorder (HCC) 5/13/2021    Seen by marriage guidance counselor 02/24/2022     Past Surgical History:   Procedure Laterality Date    APPENDECTOMY      TESTICLE EXPLORATION      undecended testicle       PAST PSYCHIATRIC MEDICATIONS  Prozac, Lexapro  Wellbutrin     Ritalin         REVIEW OF SYSTEMS:        Constitutional negative   Eyes negative   Ears/Nose/Mouth/Throat negative   Cardiovascular negative   Respiratory negative   Gastrointestinal negative   Genitourinary  DEBORAH on CPAP   Muscular negative   Integumentary negative   Neurological negative   Endocrine negative   Hematologic/Lymphatic negative     PHYSICAL EXAMINAION:  Vital signs: There were no vitals taken for this visit.  Musculoskeletal: Normal gait.   Abnormal movements: none      MENTAL STATUS EXAMINATION      General:   - Grooming and hygiene: Casual,   - Apparent distress: none,   - Behavior: Calm  - Eye Contact:  Good,   - no  psychomotor agitation or retardation    - Participation: Active verbal participation  Orientation: Alert and Fully Oriented to person, place and time  Mood: Anxious  Affect: Flexible and Full range,  Thought Process: Logical and Goal-directed  Thought Content: Denies suicidal or homicidal ideations, intent or plan   Perception: Denies auditory or visual hallucinations. No delusions noted   Attention span and concentration: Intact   Speech:Rate within normal limits and Volume within normal limits  Language: Appropriate   Insight: Good  Judgment: Good  Recent and remote memory: No gross evidence of memory deficits        DEPRESSION SCREENIN/13/2023     7:00 AM 2023     5:00 PM 2024    11:00 AM   Depression Screen (PHQ-2/PHQ-9)   PHQ-2 Total Score 0 2 0   PHQ-9 Total Score  4        Interpretation of PHQ-9 Total Score   Score Severity   1-4 No Depression   5-9 Mild Depression   10-14 Moderate Depression   15-19 Moderately Severe Depression   20-27 Severe Depression    CURRENT RISK:       Suicidal: Low       Homicidal: Low       Self-Harm: Low       Relapse: Low       Crisis Safety Plan Reviewed Not Indicated       If evidence of imminent risk is present, intervention/plan:      MEDICAL RECORDS/LABS/DIAGNOSTIC TESTS REVIEWED:  No new lab since last visit   Results        [place] [facility] records -   Reviewed     ASSESSMENT & PLAN:  Assessment & Plan      PLAN:  (1) MDD; (2) AVERY  Anxiety persisting  Increase Prozac to 60 mg daily for mood and anxiety management.    Continue Wellbutrin  mg daily for mood and focus management.   Continue psychotherapy for mood and anxiety management.  Medication options, alternatives (including no medications) and medication risks/benefits/side effects were discussed in detail.  Explained importance of contraceptive measures while on psychotropic medications, educated to let provider know if ever pregnant or wanting to become pregnant. Verbalized understanding.  The patient  was advised to call, message provider on [employer], or come in to the clinic if symptoms worsen or if any future questions/issues regarding their medications arise; the patient verbalized understanding and agreement.    The patient was educated to call 911, call the suicide hotline, or go to local ER if having thoughts of suicide or homicide; verbalized understanding.     Psychological Testing (6/5/23):  Based on the retrospective analysis, intake, review of self-report measures, and the behavioral observations and results of testing, patient does NOT meet criteria for adult ADHD.  Overall, patient agreed that a mood based hypothesis is a better fit for their symptoms than the ADHD hypothesis.  Diagnostic Impression: MDD, AVERY      Billing Coding based on:  16088 based on MDM    Return to clinic in 1 month or sooner if symptoms worsen.  Next Appointment: instruction provided on how to make the next appointment.     The proposed treatment plan was discussed with the patient who was provided the opportunity to ask questions and make suggestions regarding alternative treatment. Patient verbalized understanding and expressed agreement with the plan.       Buddy Bernard M.D.  08/09/24    This note was created using voice recognition software (Dragon). The accuracy of the dictation is limited by the abilities of the software. I have reviewed the note prior to signing, however some errors in grammar and context are still possible. If you have any questions related to this note please do not hesitate to contact our office.

## 2024-09-06 ENCOUNTER — OFFICE VISIT (OUTPATIENT)
Dept: BEHAVIORAL HEALTH | Facility: CLINIC | Age: 34
End: 2024-09-06
Payer: COMMERCIAL

## 2024-09-06 DIAGNOSIS — F33.42 RECURRENT MAJOR DEPRESSIVE DISORDER, IN FULL REMISSION (HCC): ICD-10-CM

## 2024-09-06 DIAGNOSIS — F41.1 GAD (GENERALIZED ANXIETY DISORDER): ICD-10-CM

## 2024-09-06 PROCEDURE — 99214 OFFICE O/P EST MOD 30 MIN: CPT | Performed by: PSYCHIATRY & NEUROLOGY

## 2024-09-06 RX ORDER — BUPROPION HYDROCHLORIDE 300 MG/1
300 TABLET ORAL EVERY MORNING
Qty: 90 TABLET | Refills: 1 | Status: SHIPPED | OUTPATIENT
Start: 2024-09-06

## 2024-09-06 RX ORDER — FLUOXETINE 40 MG/1
40 CAPSULE ORAL DAILY
Qty: 90 CAPSULE | Refills: 1 | Status: SHIPPED | OUTPATIENT
Start: 2024-09-06

## 2024-09-06 NOTE — PROGRESS NOTES
PSYCHIATRY FOLLOW-UP NOTE      Name: You Sumner  MRN: [id number]  : 1990  Age: 33 y.o.  Date of assessment: 2024  PCP: VIOELTA Mendoza  Persons in attendance: Patient      REASON FOR VISIT/CHIEF COMPLAINT (as stated by Patient):  You Sumner is a 33 y.o., White male, attending follow-up appointment for mood and anxiety management.      HISTORY OF PRESENT ILLNESS:  You Sumner is a 33 y.o. old male with MDD and AVERY comes in today for follow up. Patient was last seen 1 month ago, and following treatment planning recommendations were done:  Increase Prozac to 60 mg daily for mood and anxiety management.    Continue Wellbutrin  mg daily for mood and focus management.   Continue psychotherapy for mood and anxiety management.    History of Present Illness  The patient presents for evaluation of anxiety and depression.    A month ago, his Prozac dosage was increased to 60 mg to better manage his anxiety.   He reports a positive response to this adjustment, with no noticeable side effects.   He is currently balancing full-time school and work, a feat he didn't believe possible a year or two ago.   He continues to engage in therapy and uses a CPAP machine nightly.   Agreed with not titrating medications further and motivated to continue psychotherapy.        CURRENT MEDICATIONS:  Current Outpatient Medications   Medication Sig Dispense Refill    fluoxetine (PROZAC) 40 MG capsule Take 1 Capsule by mouth every day. (Prozac 40 mg + 20 mg = 60 mg daily) 90 Capsule 1    buPROPion (WELLBUTRIN XL) 300 MG XL tablet Take 1 Tablet by mouth every morning. 90 Tablet 1    FLUoxetine (PROZAC) 20 MG Cap Take 1 Capsule by mouth every morning. (Prozac 40 mg + 20 mg = 60 mg daily) 90 Capsule 1    multivitamin Tab Take 1 Tablet by mouth every day.      losartan-hydrochlorothiazide (HYZAAR) 50-12.5 MG per tablet TAKE 2 TABLETS BY MOUTH DAILY 180 Tablet 3    albuterol  108 (90 Base) MCG/ACT Aero Soln inhalation aerosol Inhale 2 Puffs every four hours as needed for Shortness of Breath. 1 Each 0    Aspirin-Acetaminophen-Caffeine (EXCEDRIN PO) Take  by mouth.       No current facility-administered medications for this visit.       MEDICAL HISTORY  Past Medical History:   Diagnosis Date    Chickenpox     Head ache     Moderate episode of recurrent major depressive disorder (HCC) 5/13/2021    Seen by marriage guidance counselor 02/24/2022     Past Surgical History:   Procedure Laterality Date    APPENDECTOMY      TESTICLE EXPLORATION      undecended testicle       PAST PSYCHIATRIC MEDICATIONS  Prozac, Lexapro  Wellbutrin     Ritalin         REVIEW OF SYSTEMS:        Constitutional negative   Eyes negative   Ears/Nose/Mouth/Throat negative   Cardiovascular negative   Respiratory negative   Gastrointestinal negative   Genitourinary  DEBORAH on CPAP   Muscular negative   Integumentary negative   Neurological negative   Endocrine negative   Hematologic/Lymphatic negative     PHYSICAL EXAMINAION:  Vital signs: There were no vitals taken for this visit.  Musculoskeletal: Normal gait.   Abnormal movements: none      MENTAL STATUS EXAMINATION      General:   - Grooming and hygiene: Casual,   - Apparent distress: none,   - Behavior: Calm  - Eye Contact:  Good,   - no psychomotor agitation or retardation    - Participation: Active verbal participation  Orientation: Alert and Fully Oriented to person, place and time  Mood: Euthymic  Affect: Flexible and Full range,  Thought Process: Logical and Goal-directed  Thought Content: Denies suicidal or homicidal ideations, intent or plan   Perception: Denies auditory or visual hallucinations. No delusions noted   Attention span and concentration: Intact   Speech:Rate within normal limits and Volume within normal limits  Language: Appropriate   Insight: Good  Judgment: Good  Recent and remote memory: No gross evidence of memory deficits        DEPRESSION  SCREENIN/13/2023     7:00 AM 2023     5:00 PM 2024    11:00 AM   Depression Screen (PHQ-2/PHQ-9)   PHQ-2 Total Score 0 2 0   PHQ-9 Total Score  4        Interpretation of PHQ-9 Total Score   Score Severity   1-4 No Depression   5-9 Mild Depression   10-14 Moderate Depression   15-19 Moderately Severe Depression   20-27 Severe Depression    CURRENT RISK:       Suicidal: Low       Homicidal: Low       Self-Harm: Low       Relapse: Low       Crisis Safety Plan Reviewed Not Indicated       If evidence of imminent risk is present, intervention/plan:      MEDICAL RECORDS/LABS/DIAGNOSTIC TESTS REVIEWED:  No new lab since last visit   Results        [place]  records -   Reviewed     ASSESSMENT & PLAN:  Assessment & Plan      PLAN:  (1) MDD; (2) AVERY  improving  Continue Prozac to 60 mg daily for mood and anxiety management.    Continue Wellbutrin  mg daily for mood and focus management.   Continue psychotherapy for mood and anxiety management.  Medication options, alternatives (including no medications) and medication risks/benefits/side effects were discussed in detail.  Explained importance of contraceptive measures while on psychotropic medications, educated to let provider know if ever pregnant or wanting to become pregnant. Verbalized understanding.  The patient was advised to call, message provider on [employer], or come in to the clinic if symptoms worsen or if any future questions/issues regarding their medications arise; the patient verbalized understanding and agreement.    The patient was educated to call 911, call the suicide hotline, or go to local ER if having thoughts of suicide or homicide; verbalized understanding.     Psychological Testing (23):  Based on the retrospective analysis, intake, review of self-report measures, and the behavioral observations and results of testing, patient does NOT meet criteria for adult ADHD.  Overall, patient agreed that a mood based hypothesis is a  better fit for their symptoms than the ADHD hypothesis.  Diagnostic Impression: MDD, AVERY      Billing Coding based on:  00135 based on MDM    Return to clinic in 3 months or sooner if symptoms worsen.  Next Appointment: instruction provided on how to make the next appointment.     The proposed treatment plan was discussed with the patient who was provided the opportunity to ask questions and make suggestions regarding alternative treatment. Patient verbalized understanding and expressed agreement with the plan.       Buddy Bernard M.D.  09/06/24    This note was created using voice recognition software (Dragon). The accuracy of the dictation is limited by the abilities of the software. I have reviewed the note prior to signing, however some errors in grammar and context are still possible. If you have any questions related to this note please do not hesitate to contact our office.

## 2024-09-09 ENCOUNTER — OFFICE VISIT (OUTPATIENT)
Dept: BEHAVIORAL HEALTH | Facility: CLINIC | Age: 34
End: 2024-09-09
Payer: COMMERCIAL

## 2024-09-09 DIAGNOSIS — F41.1 GAD (GENERALIZED ANXIETY DISORDER): ICD-10-CM

## 2024-09-09 DIAGNOSIS — F33.42 RECURRENT MAJOR DEPRESSIVE DISORDER, IN FULL REMISSION (HCC): ICD-10-CM

## 2024-09-09 PROCEDURE — 90837 PSYTX W PT 60 MINUTES: CPT | Performed by: MARRIAGE & FAMILY THERAPIST

## 2024-09-09 NOTE — PROGRESS NOTES
[facility]   Therapy Progress Note        Name: You Sumner  MRN: [id number]  : 1990  Age: 33 y.o.  Date of assessment: 2024  PCP: VIOLETA Mendoza  Persons in attendance: Patient  Total session time: 58 minutes      Topics addressed in psychotherapy include: Met with the patient in person for an individual counseling session.      Content of Therapy:   The patient discussed that he is feeling overwhelmed with different parts of his life.  The patient discussed feeling as if he is in a lead position at work.  Patient reports that he does not have a problem with this.  The patient discussed that he is doing well in all classes except for one.  He reported that the one class he is not doing well in, is based only on a pre course test.  The patient reported that he misses his wife at times when she is with her boyfriend however feels that due to his work and school schedule, this arrangement works out best.  Therapeutic Intervention:   This session, the therapeutic focus was on working with patient to compartmentalize areas of his life and work on the positive attributes.  Discussed with the patient the positive parts of his job.  He feels part of the team, is respected, and supported.  Discussed with the patient his school classes in that he has a great relationship with his  and is learning many useful things.  Discussed with the patient his personal life and how his lack of energy and finances are only temporary.      This dictation has been created using voice recognition software and/or scribes. The accuracy of the dictation is limited by the abilities of the software and the expertise of the scribes. I expect there may be some errors of grammar and possibly content. I made every attempt to manually correct the errors within my dictation. However, errors related to voice recognition software and/or scribes may still exist and should be interpreted  within the appropriate context.    Objective Observations:   Participation:Active verbal participation, Attentive, Engaged, and Open to feedback   Grooming:Casual   Cognition:Alert and Fully Oriented   Eye Contact:Good   Mood:Euthymic and Anxious   Affect:Flexible and Full range   Thought Process:Logical, Goal-directed, and Tangential   Speech:Rate within normal limits and Volume within normal limits    Current Risk:   Suicide: low   Homicide: low   Self-Harm: low   Relapse: low   Safety Plan Reviewed: not applicable    Care Plan Updated: No    Does patient express agreement with the above plan? Yes     Diagnosis:  1. Recurrent major depressive disorder, in full remission (HCC)    2. AVERY (generalized anxiety disorder)        Referral appointment(s) scheduled? No       BARRINGTON Dickson.

## 2024-09-14 DIAGNOSIS — I10 BENIGN ESSENTIAL HTN: Chronic | ICD-10-CM

## 2024-09-16 RX ORDER — LOSARTAN POTASSIUM AND HYDROCHLOROTHIAZIDE 12.5; 5 MG/1; MG/1
TABLET ORAL
Qty: 180 TABLET | Refills: 3 | Status: SHIPPED | OUTPATIENT
Start: 2024-09-16

## 2024-09-16 NOTE — TELEPHONE ENCOUNTER
Received request via: Pharmacy    Was the patient seen in the last year in this department? Yes    Does the patient have an active prescription (recently filled or refills available) for medication(s) requested? No    Pharmacy Name: [facility]    Does the patient have skilled nursing Plus and need 100-day supply? (This applies to ALL medications) Patient does not have SCP

## 2024-09-24 ENCOUNTER — HOSPITAL ENCOUNTER (OUTPATIENT)
Facility: MEDICAL CENTER | Age: 34
End: 2024-09-24
Attending: NURSE PRACTITIONER
Payer: COMMERCIAL

## 2024-09-24 ENCOUNTER — OFFICE VISIT (OUTPATIENT)
Dept: URGENT CARE | Facility: CLINIC | Age: 34
End: 2024-09-24
Payer: COMMERCIAL

## 2024-09-24 VITALS
HEIGHT: 76 IN | OXYGEN SATURATION: 96 % | DIASTOLIC BLOOD PRESSURE: 74 MMHG | HEART RATE: 74 BPM | BODY MASS INDEX: 37.63 KG/M2 | WEIGHT: 309 LBS | RESPIRATION RATE: 14 BRPM | SYSTOLIC BLOOD PRESSURE: 140 MMHG | TEMPERATURE: 97.7 F

## 2024-09-24 DIAGNOSIS — J02.9 PHARYNGITIS, UNSPECIFIED ETIOLOGY: ICD-10-CM

## 2024-09-24 DIAGNOSIS — J06.9 VIRAL URI: ICD-10-CM

## 2024-09-24 LAB
FLUAV RNA SPEC QL NAA+PROBE: NEGATIVE
FLUBV RNA SPEC QL NAA+PROBE: NEGATIVE
RSV RNA SPEC QL NAA+PROBE: NEGATIVE
S PYO DNA SPEC NAA+PROBE: NOT DETECTED
SARS-COV-2 RNA RESP QL NAA+PROBE: NOTDETECTED
SPECIMEN SOURCE: NORMAL

## 2024-09-24 PROCEDURE — 3077F SYST BP >= 140 MM HG: CPT | Performed by: NURSE PRACTITIONER

## 2024-09-24 PROCEDURE — 87651 STREP A DNA AMP PROBE: CPT | Performed by: NURSE PRACTITIONER

## 2024-09-24 PROCEDURE — 99213 OFFICE O/P EST LOW 20 MIN: CPT | Performed by: NURSE PRACTITIONER

## 2024-09-24 PROCEDURE — 0241U HCHG SARS-COV-2 COVID-19 NFCT DS RESP RNA 4 TRGT MIC: CPT

## 2024-09-24 PROCEDURE — 3078F DIAST BP <80 MM HG: CPT | Performed by: NURSE PRACTITIONER

## 2024-09-24 ASSESSMENT — ENCOUNTER SYMPTOMS
VOMITING: 0
SORE THROAT: 1
MYALGIAS: 0
EYE PAIN: 0
COUGH: 1
CHILLS: 1
WHEEZING: 0
RHINORRHEA: 1
SHORTNESS OF BREATH: 0
HEADACHES: 0
NAUSEA: 0
FEVER: 1
DIZZINESS: 0

## 2024-09-24 ASSESSMENT — FIBROSIS 4 INDEX: FIB4 SCORE: 0.55

## 2024-09-24 NOTE — LETTER
September 24, 2024         Patient: You Sumner   YOB: 1990   Date of Visit: 9/24/2024           To Whom it May Concern:    You Sumner was seen in my clinic on 9/24/2024. He may return to school on 9/26/24.    If you have any questions or concerns, please don't hesitate to call.        Sincerely,           VINOD Connelly.  Electronically Signed

## 2024-09-25 NOTE — PROGRESS NOTES
Subjective:   You Sumner is a 33 y.o. male who presents for Pharyngitis (Sx started 5 days ago, Sore throat, congestion, pressure in head, coughing, Needs Drs. Note )      URI   This is a new problem. Episode onset: 5 days. The problem has been unchanged. Associated symptoms include congestion, coughing, rhinorrhea and a sore throat. Pertinent negatives include no chest pain, ear pain, headaches, nausea, rash, vomiting or wheezing. He has tried acetaminophen for the symptoms.       Review of Systems   Constitutional:  Positive for chills, fever and malaise/fatigue.   HENT:  Positive for congestion, rhinorrhea and sore throat. Negative for ear pain.    Eyes:  Negative for pain.   Respiratory:  Positive for cough. Negative for shortness of breath and wheezing.    Cardiovascular:  Negative for chest pain.   Gastrointestinal:  Negative for nausea and vomiting.   Genitourinary:  Negative for hematuria.   Musculoskeletal:  Negative for myalgias.   Skin:  Negative for rash.   Neurological:  Negative for dizziness and headaches.       Medications:    albuterol Aers  buPROPion  EXCEDRIN PO  fluoxetine  FLUoxetine Caps  losartan-hydrochlorothiazide  multivitamin Tabs    Allergies: Patient has no known allergies.    Problem List: You Sumner does not have any pertinent problems on file.    Surgical History:  Past Surgical History:   Procedure Laterality Date    APPENDECTOMY      TESTICLE EXPLORATION      undecended testicle       Past Social Hx: You Sumner  reports that he quit smoking about 2 years ago. His smoking use included cigarettes and pipe. He has never used smokeless tobacco. He reports current alcohol use. He reports that he does not use drugs.     Past Family Hx:  You Sumner family history includes Heart Disease in his father; Hyperlipidemia in his father; Hypertension in his father; No Known Problems in his brother, sister, and sister; Sleep Apnea in  "his father and mother.     Problem list, medications, and allergies reviewed by myself today in Epic.     Objective:     BP (!) 140/74 (BP Location: Left arm, Patient Position: Sitting, BP Cuff Size: Adult)   Pulse 74   Temp 36.5 °C (97.7 °F) (Temporal)   Resp 14   Ht 1.93 m (6' 4\")   Wt (!) 140 kg (309 lb)   SpO2 96%   BMI 37.61 kg/m²     Physical Exam  Vitals and nursing note reviewed.   Constitutional:       General: He is not in acute distress.     Appearance: He is well-developed.   HENT:      Head: Normocephalic and atraumatic.      Right Ear: Tympanic membrane and external ear normal.      Left Ear: Tympanic membrane and external ear normal.      Nose: Nose normal.      Right Sinus: No maxillary sinus tenderness or frontal sinus tenderness.      Left Sinus: No maxillary sinus tenderness or frontal sinus tenderness.      Mouth/Throat:      Mouth: Oropharynx is clear and moist and mucous membranes are normal. Mucous membranes are moist.      Pharynx: Uvula midline. No posterior oropharyngeal edema or posterior oropharyngeal erythema.      Tonsils: No tonsillar exudate or tonsillar abscesses.   Eyes:      General:         Right eye: No discharge.         Left eye: No discharge.      Conjunctiva/sclera: Conjunctivae normal.   Cardiovascular:      Rate and Rhythm: Normal rate.   Pulmonary:      Effort: Pulmonary effort is normal. No respiratory distress.      Breath sounds: Normal breath sounds.   Abdominal:      General: There is no distension.   Musculoskeletal:         General: Normal range of motion.   Skin:     General: Skin is warm, dry and intact.   Neurological:      General: No focal deficit present.      Mental Status: He is alert and oriented to person, place, and time. Mental status is at baseline.      Gait: Gait (gait at baseline) normal.   Psychiatric:         Judgment: Judgment normal.         Assessment/Plan:     Diagnosis and associated orders:     1. Pharyngitis, unspecified etiology  " POCT GROUP A STREP, PCR    POCT CoV-2, Flu A/B, RSV by PCR    CoV-2, Flu A/B, And RSV by PCR (Cepheid)      2. Viral URI              Comments/MDM:     .The patient's presenting symptoms and exam findings most likely are due to a viral etiology.   Symptomatic and supportive care:   Plenty of oral hydration and rest   Over the counter cough suppressant as directed.  Tylenol or ibuprofen for pain and fever as directed.   Warm salt water gargles for sore throat, soft foods, cool liquids.   Saline nasal spray and Flonase  Infection control measures at home. Stay away from people, Hand washing, covering sneeze/cough, disinfect surfaces.   Remain home from work, school, and other populated environments.    Overall, the patient is well-appearing. They are not hypoxic, afebrile, and a normal pulmonary exam.                   Please note that this dictation was created using voice recognition software. I have made a reasonable attempt to correct obvious errors, but I expect that there are errors of grammar and possibly content that I did not discover before finalizing the note.    This note was electronically signed by Bj TOLEDO.

## 2024-10-18 ENCOUNTER — APPOINTMENT (OUTPATIENT)
Dept: SLEEP MEDICINE | Facility: MEDICAL CENTER | Age: 34
End: 2024-10-18
Attending: NURSE PRACTITIONER
Payer: COMMERCIAL

## 2024-10-24 ENCOUNTER — OFFICE VISIT (OUTPATIENT)
Dept: BEHAVIORAL HEALTH | Facility: CLINIC | Age: 34
End: 2024-10-24
Payer: COMMERCIAL

## 2024-10-24 DIAGNOSIS — F33.42 RECURRENT MAJOR DEPRESSIVE DISORDER, IN FULL REMISSION (HCC): ICD-10-CM

## 2024-10-24 PROCEDURE — 90837 PSYTX W PT 60 MINUTES: CPT | Performed by: MARRIAGE & FAMILY THERAPIST

## 2024-10-24 NOTE — PROGRESS NOTES
[facility]   Therapy Progress Note        Name: You Sumner  MRN: [id number]  : 1990  Age: 33 y.o.  Date of assessment: 10/24/2024  PCP: VIOLETA Mendoza  Persons in attendance: {[facility] ATTENDEES:[id number]}  Total session time: *** minutes      Topics addressed in psychotherapy include: ***    Objective Observations:   Participation:{[facility] PARTICIPATION MEASURES:[id number]}   Grooming:{[facility]}   Cognition:{[facility] ORIENTATION:01424596}   Eye Contact:{[facility] EYE CONTACT:84745050}   Mood:{[facility] MOOD:18598850}   Affect:{[facility] AFFECT:16033066}   Thought Process:{[facility] THOUGHT PROCESS:14753884}   Speech:{[facility] SPEECH:73099293}    Current Risk:   Suicide: {Desc; low/moderate/high:455406}   Homicide: {Desc; low/moderate/high:151311}   Self-Harm: {Desc; low/moderate/high:098904}   Relapse: {Desc; low/moderate/high:772026}   Safety Plan Reviewed: {Response; yes/no/na:71650}    Care Plan Updated: {Yes/No/WC:829045}    Does patient express agreement with the above plan? {Yes/No/WC:357048}     Diagnosis:  No diagnosis found.    Referral appointment(s) scheduled? {Yes/No/WC:412806}       GOLDEN Dickson     feedback   Grooming:Casual   Cognition:Alert and Fully Oriented   Eye Contact:Good   Mood:Euthymic and Anxious   Affect:Flexible, Full range, and Congruent with content   Thought Process:Logical and Goal-directed   Speech:Rate within normal limits and Volume within normal limits    Current Risk:   Suicide: low   Homicide: low   Self-Harm: low   Relapse: low   Safety Plan Reviewed: not applicable    Care Plan Updated: No    Does patient express agreement with the above plan? Yes     Diagnosis:  1. Recurrent major depressive disorder, in full remission (HCC)        Referral appointment(s) scheduled? No       GOLDEN Dickson

## 2024-11-12 DIAGNOSIS — F41.1 GAD (GENERALIZED ANXIETY DISORDER): ICD-10-CM

## 2024-11-12 DIAGNOSIS — F33.42 RECURRENT MAJOR DEPRESSIVE DISORDER, IN FULL REMISSION (HCC): ICD-10-CM

## 2024-11-21 ENCOUNTER — APPOINTMENT (OUTPATIENT)
Dept: SLEEP MEDICINE | Facility: MEDICAL CENTER | Age: 34
End: 2024-11-21
Attending: NURSE PRACTITIONER

## 2024-11-21 ENCOUNTER — APPOINTMENT (OUTPATIENT)
Dept: BEHAVIORAL HEALTH | Facility: CLINIC | Age: 34
End: 2024-11-21
Payer: COMMERCIAL

## 2024-12-03 ENCOUNTER — TELEMEDICINE (OUTPATIENT)
Dept: BEHAVIORAL HEALTH | Facility: CLINIC | Age: 34
End: 2024-12-03

## 2024-12-03 DIAGNOSIS — F41.1 GAD (GENERALIZED ANXIETY DISORDER): ICD-10-CM

## 2024-12-03 DIAGNOSIS — F33.42 RECURRENT MAJOR DEPRESSIVE DISORDER, IN FULL REMISSION (HCC): ICD-10-CM

## 2024-12-03 PROCEDURE — 99214 OFFICE O/P EST MOD 30 MIN: CPT | Mod: 95 | Performed by: PSYCHIATRY & NEUROLOGY

## 2024-12-03 NOTE — PROGRESS NOTES
This evaluation was conducted via Teams using secure and encrypted videoconferencing technology. The patient was in their home in the [facility].    The patient's identity was confirmed and verbal consent was obtained for this virtual visit.      PSYCHIATRY FOLLOW-UP NOTE      Name: You Sumner  MRN: [id number]  : 1990  Age: 34 y.o.  Date of assessment: 12/3/2024  PCP: VIOLETA Mendoza  Persons in attendance: Patient      REASON FOR VISIT/CHIEF COMPLAINT (as stated by Patient):  You Sumner is a 34 y.o., White male, attending follow-up appointment for mood and anxiety management.      HISTORY OF PRESENT ILLNESS:  You Sumner is a 34 y.o. old male with MDD and AVERY comes in today for follow up. Patient was last seen 3 months ago, and following treatment planning recommendations were done:  Continue Prozac to 60 mg daily for mood and anxiety management.    Continue Wellbutrin  mg daily for mood and focus management.   Continue psychotherapy for mood and anxiety management.    History of Present Illness    He is currently on a regimen of Prozac 60 mg and Wellbutrin 300 mg, which he reports as being effective. He has experienced no side effects from this combination.   His wife has observed that his depressive episodes seem to coincide with periods of high stress and anxiety, leading her to question whether his primary issue might be anxiety rather than depression. However, he does not feel excessively anxious.  Psychological testing confirmed MDD and AVERY and did not confirmed ADHD  He has recently completed his calculus and physics courses, which has significantly reduced his stress levels. His performance at work and in his other two classes has improved as a result. He has one week remaining in the semester.  He is also taking losartan/hydrochlorothiazide and continues to use his CPAP machine. His blood pressure is well-controlled.  Patient  agreed with plan of closely monitoring for signs of anxiety or obsessions and accordingly will consider reducing Wellbutrin in the future if indicated.  Patient is in agreement with this plan.      CURRENT MEDICATIONS:  Current Outpatient Medications   Medication Sig Dispense Refill    FLUoxetine (PROZAC) 20 MG Cap TAKE ONE CAPSULE BY MOUTH EVERY MORNING.(40MG PLUS 20MG= 60MG DAILY) 90 Capsule 1    losartan-hydrochlorothiazide (HYZAAR) 50-12.5 MG per tablet TAKE 2 TABLETS BY MOUTH DAILY 180 Tablet 3    buPROPion (WELLBUTRIN XL) 300 MG XL tablet Take 1 Tablet by mouth every morning. 90 Tablet 1    fluoxetine (PROZAC) 40 MG capsule Take 1 Capsule by mouth every day. (Prozac 40 mg + 20 mg = 60 mg daily) 90 Capsule 1    multivitamin Tab Take 1 Tablet by mouth every day.      albuterol 108 (90 Base) MCG/ACT Aero Soln inhalation aerosol Inhale 2 Puffs every four hours as needed for Shortness of Breath. 1 Each 0    Aspirin-Acetaminophen-Caffeine (EXCEDRIN PO) Take  by mouth.       No current facility-administered medications for this visit.       MEDICAL HISTORY  Past Medical History:   Diagnosis Date    Chickenpox     Head ache     Moderate episode of recurrent major depressive disorder (HCC) 5/13/2021    Seen by marriage guidance counselor 02/24/2022     Past Surgical History:   Procedure Laterality Date    APPENDECTOMY      TESTICLE EXPLORATION      undecended testicle       PAST PSYCHIATRIC MEDICATIONS  Prozac, Lexapro  Wellbutrin     Ritalin         REVIEW OF SYSTEMS:        Constitutional negative   Eyes negative   Ears/Nose/Mouth/Throat negative   Cardiovascular negative   Respiratory negative   Gastrointestinal negative   Genitourinary  DEBORAH on CPAP   Muscular negative   Integumentary negative   Neurological negative   Endocrine negative   Hematologic/Lymphatic negative     PHYSICAL EXAMINAION:  Vital signs: There were no vitals taken for this visit.  Musculoskeletal: Normal gait.   Abnormal movements:  none      MENTAL STATUS EXAMINATION      General:   - Grooming and hygiene: Casual,   - Apparent distress: calm,   - Behavior: Calm  - Eye Contact:  Good,   - no psychomotor agitation or retardation    - Participation: Active verbal participation  Orientation: Alert and Fully Oriented to person, place and time  Mood: Anxious  Affect: Flexible and Full range,  Thought Process: Logical and Goal-directed  Thought Content: Denies suicidal or homicidal ideations, intent or plan   Perception: Denies auditory or visual hallucinations. No delusions noted   Attention span and concentration: Intact   Speech:Rate within normal limits and Volume within normal limits  Language: Appropriate   Insight: Good  Judgment: Good  Recent and remote memory: No gross evidence of memory deficits        DEPRESSION SCREENIN/13/2023     7:00 AM 2023     5:00 PM 2024    11:00 AM   Depression Screen (PHQ-2/PHQ-9)   PHQ-2 Total Score 0 2 0   PHQ-9 Total Score  4        Interpretation of PHQ-9 Total Score   Score Severity   1-4 No Depression   5-9 Mild Depression   10-14 Moderate Depression   15-19 Moderately Severe Depression   20-27 Severe Depression    CURRENT RISK:       Suicidal: Low       Homicidal: Low       Self-Harm: Low       Relapse: Low       Crisis Safety Plan Reviewed Not Indicated       If evidence of imminent risk is present, intervention/plan:      MEDICAL RECORDS/LABS/DIAGNOSTIC TESTS REVIEWED:  No new lab since last visit     [place]  records -   Reviewed     PLAN:  (1) MDD; (2) AVERY  improving  Continue Prozac 60 mg daily for mood and anxiety management.    Continue Wellbutrin  mg daily for mood and focus management.   Continue psychotherapy for mood and anxiety management.  Medication options, alternatives (including no medications) and medication risks/benefits/side effects were discussed in detail.  Explained importance of contraceptive measures while on psychotropic medications, educated to let  provider know if ever pregnant or wanting to become pregnant. Verbalized understanding.  The patient was advised to call, message provider on [facility], or come in to the clinic if symptoms worsen or if any future questions/issues regarding their medications arise; the patient verbalized understanding and agreement.    The patient was educated to call 911, call the suicide hotline, or go to local ER if having thoughts of suicide or homicide; verbalized understanding.     Psychological Testing (6/5/23):  Based on the retrospective analysis, intake, review of self-report measures, and the behavioral observations and results of testing, patient does NOT meet criteria for adult ADHD.  Overall, patient agreed that a mood based hypothesis is a better fit for their symptoms than the ADHD hypothesis.  Diagnostic Impression: MDD, AVERY      Billing Coding based on:  54233 based on MDM    Return to clinic in 3 months or sooner if symptoms worsen.  Next Appointment: instruction provided on how to make the next appointment.     The proposed treatment plan was discussed with the patient who was provided the opportunity to ask questions and make suggestions regarding alternative treatment. Patient verbalized understanding and expressed agreement with the plan.       Buddy Bernard M.D.  12/03/24    This note was created using voice recognition software (Dragon). The accuracy of the dictation is limited by the abilities of the software. I have reviewed the note prior to signing, however some errors in grammar and context are still possible. If you have any questions related to this note please do not hesitate to contact our office.

## 2024-12-19 ENCOUNTER — OFFICE VISIT (OUTPATIENT)
Dept: BEHAVIORAL HEALTH | Facility: CLINIC | Age: 34
End: 2024-12-19

## 2024-12-19 DIAGNOSIS — F33.42 RECURRENT MAJOR DEPRESSIVE DISORDER, IN FULL REMISSION (HCC): ICD-10-CM

## 2024-12-19 DIAGNOSIS — F41.1 GAD (GENERALIZED ANXIETY DISORDER): ICD-10-CM

## 2024-12-19 PROCEDURE — 90837 PSYTX W PT 60 MINUTES: CPT | Performed by: MARRIAGE & FAMILY THERAPIST

## 2024-12-19 NOTE — PROGRESS NOTES
[facility]   Therapy Progress Note        Name: You Sumner  MRN: [id number]  : 1990  Age: 34 y.o.  Date of assessment: 2024  PCP: VIOLETA Mendoza  Persons in attendance: Patient  Total session time: 56 minutes      Topics addressed in psychotherapy include: Met with the patient in person for an individual counseling session. The patient was last seen on 2024.  Content of Therapy:   Met with the patient for an individual counseling session.  The patient discussed that he feels his depression is increasing due to multiple factors.  Patient discussed living in [place] with limited options is affecting him.  Patient did discuss positives of a [employer] system certification which may be available to him as part of an interim to his long-term goal of an engineering degree.  Patient reported struggling with classes which resulted in auditing them.  Patient discuss concerns regarding having children and where he is in his life.  Patient discuss concerns regarding finances.  Therapeutic Intervention:   This session, the therapeutic focus was on validating the patient's growth and struggles.  Worked with the patient has he is demonstrated capacity for personal responsibility and a willingness to tackle challenging emotions.  Plan:  Work with patient on treatment goals of managing emotions.    Impression:    Recurrent major depressive disorder, in full remission (HCC) - increasing  AVERY (generalized anxiety disorder) - increasing         This dictation has been created using voice recognition software and/or scribes. The accuracy of the dictation is limited by the abilities of the software and the expertise of the scribes. I expect there may be some errors of grammar and possibly content. I made every attempt to manually correct the errors within my dictation. However, errors related to voice recognition software and/or scribes may still exist and  should be interpreted within the appropriate context.    Objective Observations:   Participation:Active verbal participation, Attentive, and Engaged   Grooming:Casual   Cognition:Alert and Fully Oriented   Eye Contact:Good   Mood:Depressed   Affect:Flexible, Full range, Congruent with content, Sad, and tired   Thought Process:Logical and Goal-directed   Speech:Rate within normal limits and Volume within normal limits    Current Risk:   Suicide: low   Homicide: low   Self-Harm: low   Relapse: low   Safety Plan Reviewed: not applicable    Care Plan Updated: No    Does patient express agreement with the above plan? Yes     Diagnosis:  1. Recurrent major depressive disorder, in full remission (HCC)    2. AVERY (generalized anxiety disorder)        Referral appointment(s) scheduled? No       BARRINGTON Dickson.

## 2025-02-08 DIAGNOSIS — F33.42 RECURRENT MAJOR DEPRESSIVE DISORDER, IN FULL REMISSION (HCC): ICD-10-CM

## 2025-02-10 RX ORDER — BUPROPION HYDROCHLORIDE 300 MG/1
300 TABLET ORAL EVERY MORNING
Qty: 30 TABLET | Refills: 0 | Status: SHIPPED | OUTPATIENT
Start: 2025-02-10

## 2025-03-17 ENCOUNTER — APPOINTMENT (OUTPATIENT)
Dept: BEHAVIORAL HEALTH | Facility: CLINIC | Age: 35
End: 2025-03-17

## 2025-04-29 ENCOUNTER — OFFICE VISIT (OUTPATIENT)
Dept: BEHAVIORAL HEALTH | Facility: CLINIC | Age: 35
End: 2025-04-29

## 2025-04-29 DIAGNOSIS — F41.1 GAD (GENERALIZED ANXIETY DISORDER): ICD-10-CM

## 2025-04-29 DIAGNOSIS — F33.2 SEVERE EPISODE OF RECURRENT MAJOR DEPRESSIVE DISORDER, WITHOUT PSYCHOTIC FEATURES (HCC): ICD-10-CM

## 2025-04-30 NOTE — PROGRESS NOTES
[facility]   Therapy Progress Note        Name: You Sumner  MRN: [id number]  : 1990  Age: 34 y.o.  Date of assessment: 2025  PCP: VIOLETA Mendoza  Persons in attendance: Patient  Total session time: 57 minutes      Topics addressed in psychotherapy include: Met with the patient in person for an individual counseling session. The patient was last seen on 2024 for individual counseling.    Content of Therapy:   The patient discussed that he continues to suffer with his depression as he has lost his job  and has not found another yet.  The patient felt he had A perfect job opportunity with [employer] but lost out to another can it.  Patient discussed feeling like a failure.  On a more positive note however the patient 's wife has found employment.  The patient discussed feeling as if his wife has not held up her part of the bargain where she was to complete school while he worked and now he was to be able to attend school while she works.  Patient discussed concerns regarding finances.      Therapeutic Intervention:   This session, the therapeutic focus was on validating the patient's growth and struggles.  Worked with the patient as he demonstrated capacity for personal responsibility yet has difficulty in his current mindset of shared responsibility.  The patient struggles with black and white thinking regarding moving up in a job and would like to find a career position.  Patient delegated that he feels he has lost time by not achieving life goals by this age.    Plan:  Work with patient on treatment goals of managing emotions.      Impression:     Recurrent major depressive disorder, in full remission (HCC) - increasing  AVERY (generalized anxiety disorder) - increasing           This dictation has been created using voice recognition software and/or scribes. The accuracy of the dictation is limited by the abilities of the software and the expertise of the  scribes. I expect there may be some errors of grammar and possibly content. I made every attempt to manually correct the errors within my dictation. However, errors related to voice recognition software and/or scribes may still exist and should be interpreted within the appropriate context.    Objective Observations:   Participation:Active verbal participation, Attentive, Engaged, and Defensive   Grooming:Casual   Cognition:Alert and Fully Oriented   Eye Contact:Good   Mood:Depressed   Affect:Flexible, Full range, Congruent with content, Sad, and Tearful   Thought Process:Logical and Goal-directed   Speech:Rate within normal limits, Volume within normal limits, and Soft    Current Risk:   Suicide: low   Homicide: low   Self-Harm: low   Relapse: low   Safety Plan Reviewed: not applicable    Care Plan Updated: No    Does patient express agreement with the above plan? Yes     Diagnosis:  1. AVERY (generalized anxiety disorder)    2. Severe episode of recurrent major depressive disorder, without psychotic features (HCC)        Referral appointment(s) scheduled? No       BARRINGTON Dickson.

## 2025-05-12 ENCOUNTER — OFFICE VISIT (OUTPATIENT)
Dept: BEHAVIORAL HEALTH | Facility: CLINIC | Age: 35
End: 2025-05-12

## 2025-05-12 DIAGNOSIS — F41.1 GAD (GENERALIZED ANXIETY DISORDER): ICD-10-CM

## 2025-05-12 DIAGNOSIS — F33.2 SEVERE EPISODE OF RECURRENT MAJOR DEPRESSIVE DISORDER, WITHOUT PSYCHOTIC FEATURES (HCC): Primary | ICD-10-CM

## 2025-05-12 PROCEDURE — 90837 PSYTX W PT 60 MINUTES: CPT | Performed by: MARRIAGE & FAMILY THERAPIST

## 2025-05-12 NOTE — PROGRESS NOTES
[facility]   Therapy Progress Note        Name: You Sumner  MRN: [id number]  : 1990  Age: 34 y.o.  Date of assessment: 2025  PCP: VIOLETA Mendoza  Persons in attendance: Patient  Total session time: 58 minutes      Topics addressed in psychotherapy include:   The patient discussed feeling in a much better mood and doing much better than at our last appointment.  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  The patient discussed that he will be taking a class in the fall and that he is prepping for that class prior to taking it.  The patient discussed that he has concerns regarding his wife's boyfriends it possible move to [facility].  He is concerned that this could end his marriage.       Therapeutic Intervention:   This session, the therapeutic focus was on validating the patient's growth and recent changes.  Worked with patient on identifying steps he took to make such a rapid change in emotions.  Discussed with the patient acceptance and commitment of his values and goals.    Plan:  Work with patient on treatment goals of managing emotions.       Impression:     Recurrent major depressive disorder, in full remission (HCC) - decreasing  AVERY (generalized anxiety disorder) - decreasing    This dictation has been created using voice recognition software and/or scribes. The accuracy of the dictation is limited by the abilities of the software and the expertise of the scribes. I expect there may be some errors of grammar and possibly content. I made every attempt to manually correct the errors within my dictation. However, errors related to voice recognition software and/or scribes may still exist and should be interpreted within the appropriate context.          Objective  Observations:   Participation:Active verbal participation, Attentive, and Engaged   Grooming:Casual   Cognition:Alert and Fully Oriented   Eye Contact:Good   Mood:Euthymic and Happy   Affect:Flexible, Full range, and Congruent with content   Thought Process:Logical and Goal-directed   Speech:Rate within normal limits and Volume within normal limits    Current Risk:   Suicide: low   Homicide: low   Self-Harm: low   Relapse: low   Safety Plan Reviewed: not applicable    Care Plan Updated: No    Does patient express agreement with the above plan? Yes     Diagnosis:  No diagnosis found.    Referral appointment(s) scheduled? No       GOLDEN Dickson

## 2025-05-19 NOTE — PSYCHOTHERAPY
Hello. Reaching out to let you know about some concerning behavior I’m seeing with Leroy. The past couple of weeks he has been having explosive fits of rage towards me, has more depressive swings, sporadically eats, and sleeps more than usual. Can you please discuss this during his next appointment with you? I’m very concerned for him, and also struggling to be around him at this point.

## 2025-05-27 ENCOUNTER — OFFICE VISIT (OUTPATIENT)
Dept: BEHAVIORAL HEALTH | Facility: CLINIC | Age: 35
End: 2025-05-27

## 2025-05-27 DIAGNOSIS — F33.2 SEVERE EPISODE OF RECURRENT MAJOR DEPRESSIVE DISORDER, WITHOUT PSYCHOTIC FEATURES (HCC): Primary | ICD-10-CM

## 2025-05-27 DIAGNOSIS — F41.1 GAD (GENERALIZED ANXIETY DISORDER): ICD-10-CM

## 2025-05-27 PROCEDURE — 90837 PSYTX W PT 60 MINUTES: CPT | Performed by: MARRIAGE & FAMILY THERAPIST

## 2025-05-29 NOTE — PROGRESS NOTES
[facility]   Therapy Progress Note        Name: You Sumner  MRN: [id number]  : 1990  Age: 34 y.o.  Date of assessment: 2025  PCP: VIOLETA Mendoza  Persons in attendance: Patient  Total session time: 59 minutes      Topics addressed in psychotherapy include: Met with the patient in person for an individual counseling session. The patient was last seen by this clinician on May 12, 2025.     Content of Therapy:   The patient tearfully discussed that his wife told him that she wants to divorce.  He reported that she stated that while he is trying, she is not seeing results fast enough.  As of our last session, the patient appeared to have made good progress.  The patient discussed options he has in his life.  Patient reported that his wife has agreed to help out with rent this month but not after that.  She reportedly will be moving in with her parents.    Therapeutic Intervention:   This session, the therapeutic focus was on consoling the patient with his recent loss.  Discussed with the patient how he has worked hard over the past five years to maintain the marriage as he felt they were working towards a common goal.  Discussed with the patient his options which include a fly in jobs in [place] patient reported that he could possibly fly there work 20 days and then be off for 10.  Discussed with the patient the benefits of having some alone time and reads am in his own priorities.    Plan:  Work with patient on treatment goals of managing emotions.       Impression:     Recurrent major depressive disorder, in full remission (HCC) - significant increase  AVERY (generalized anxiety disorder) - significant increase     This dictation has been created using voice recognition software and/or scribes. The accuracy of the dictation is limited by the abilities of the software and the expertise of the scribes. I expect there may be some errors of grammar and possibly content. I  made every attempt to manually correct the errors within my dictation. However, errors related to voice recognition software and/or scribes may still exist and should be interpreted within the appropriate context.        Objective Observations:   Participation:Active verbal participation, Attentive, Engaged, and Open to feedback   Grooming:Casual   Cognition:Alert and Fully Oriented   Eye Contact:Good   Mood:Depressed and Anxious   Affect:Flexible, Full range, Congruent with content, Sad, Anxious, and Tearful   Thought Process:Logical and Goal-directed   Speech:Rate within normal limits and Volume within normal limits    Current Risk:   Suicide: low   Homicide: low   Self-Harm: low   Relapse: low   Safety Plan Reviewed: no    Care Plan Updated: No    Does patient express agreement with the above plan? Yes     Diagnosis:  1. Severe episode of recurrent major depressive disorder, without psychotic features (HCC)    2. AVERY (generalized anxiety disorder)        Referral appointment(s) scheduled? No       BARRINGTON Dickson.

## 2025-06-12 ENCOUNTER — APPOINTMENT (OUTPATIENT)
Dept: BEHAVIORAL HEALTH | Facility: CLINIC | Age: 35
End: 2025-06-12

## 2025-06-24 ENCOUNTER — APPOINTMENT (OUTPATIENT)
Dept: BEHAVIORAL HEALTH | Facility: CLINIC | Age: 35
End: 2025-06-24

## 2025-07-29 ENCOUNTER — APPOINTMENT (OUTPATIENT)
Dept: BEHAVIORAL HEALTH | Facility: CLINIC | Age: 35
End: 2025-07-29

## 2025-07-29 DIAGNOSIS — F41.1 GAD (GENERALIZED ANXIETY DISORDER): ICD-10-CM

## 2025-07-29 DIAGNOSIS — F33.2 SEVERE EPISODE OF RECURRENT MAJOR DEPRESSIVE DISORDER, WITHOUT PSYCHOTIC FEATURES (HCC): Primary | ICD-10-CM

## 2025-07-31 NOTE — PROGRESS NOTES
[facility]   Therapy Progress Note        Name: You Sumner  MRN: [id number]  : 1990  Age: 34 y.o.  Date of assessment: 2025  PCP: VIOLETA Mendoza  Persons in attendance: Patient  Total session time: 59 minutes      Topics addressed in psychotherapy include: Met with the patient in person for an individual counseling session. The patient was last seen by this clinician on May 27, 2025.     Content of Therapy:   The patient discussed that he is feeling that since his divorce, he has less anxiety and less stress.  Patient feels that he can now eat whatever he wants without having to worry about his wife's food allergies.  The patient discussed that his wife has now moved into her boyfriend's home.  Patient discussed mixed emotions of sadness, anger, and calmness.  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  The patient discussed that he went camping with a friend group and had a great time.  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  The patient discussed having a definite focus on completing his education.    Therapeutic Intervention:   During this session, the therapeutic focus was on validating the patient's emotions and dealing with the loss of this relationship.  Discussed the positive actions which he is taking in his life.  Provided supportive psychotherapy    Plan:  Work with patient on treatment goals of managing emotions.       Impression:     Recurrent major depressive disorder, in full remission (HCC) - significant increase  AVERY (generalized anxiety disorder) - significant increase     This dictation has been created using voice recognition software and/or scribes. The accuracy of the dictation is limited by the abilities of the software and the expertise of the scribes. I expect there may be some errors of grammar and possibly content. I made  every attempt to manually correct the errors within my dictation. However, errors related to voice recognition software and/or scribes may still exist and should be interpreted within the appropriate context.        Objective Observations:   Participation:Active verbal participation, Attentive, Engaged, and Open to feedback   Grooming:Casual   Cognition:Alert and Fully Oriented   Eye Contact:Good   Mood:Depressed and Anxious   Affect:Flexible, Full range, Congruent with content, Sad, Anxious, and Tearful   Thought Process:Logical and Goal-directed   Speech:Rate within normal limits and Volume within normal limits    Current Risk:   Suicide: low   Homicide: low   Self-Harm: low   Relapse: low   Safety Plan Reviewed: no    Care Plan Updated: No    Does patient express agreement with the above plan? Yes     Diagnosis:  1. Severe episode of recurrent major depressive disorder, without psychotic features (HCC)    2. AVERY (generalized anxiety disorder)          Referral appointment(s) scheduled? No       BARRINGTON Dickson.

## 2025-08-12 ENCOUNTER — APPOINTMENT (OUTPATIENT)
Dept: BEHAVIORAL HEALTH | Facility: CLINIC | Age: 35
End: 2025-08-12